# Patient Record
Sex: MALE | Race: WHITE | Employment: OTHER | URBAN - METROPOLITAN AREA
[De-identification: names, ages, dates, MRNs, and addresses within clinical notes are randomized per-mention and may not be internally consistent; named-entity substitution may affect disease eponyms.]

---

## 2018-10-20 ENCOUNTER — APPOINTMENT (OUTPATIENT)
Dept: CT IMAGING | Age: 68
End: 2018-10-20
Payer: MEDICARE

## 2018-10-20 ENCOUNTER — HOSPITAL ENCOUNTER (EMERGENCY)
Age: 68
Discharge: HOME | End: 2018-10-20
Attending: EMERGENCY MEDICINE
Payer: MEDICARE

## 2018-10-20 VITALS
HEIGHT: 66 IN | BODY MASS INDEX: 37.73 KG/M2 | DIASTOLIC BLOOD PRESSURE: 88 MMHG | OXYGEN SATURATION: 94 % | WEIGHT: 234.79 LBS | HEART RATE: 97 BPM | TEMPERATURE: 97.3 F | RESPIRATION RATE: 18 BRPM | SYSTOLIC BLOOD PRESSURE: 148 MMHG

## 2018-10-20 DIAGNOSIS — N23 RENAL COLIC: ICD-10-CM

## 2018-10-20 DIAGNOSIS — N20.1 URETEROLITHIASIS: Primary | ICD-10-CM

## 2018-10-20 LAB
ANION GAP SERPL CALCULATED.3IONS-SCNC: 15 MMOL/L (ref 3–16)
BACTERIA: ABNORMAL /HPF
BASOPHILS ABSOLUTE: 0.1 K/UL (ref 0–0.2)
BASOPHILS RELATIVE PERCENT: 0.5 %
BILIRUBIN URINE: ABNORMAL
BLOOD, URINE: ABNORMAL
BUN BLDV-MCNC: 23 MG/DL (ref 7–20)
CALCIUM SERPL-MCNC: 10.1 MG/DL (ref 8.3–10.6)
CHLORIDE BLD-SCNC: 102 MMOL/L (ref 99–110)
CLARITY: ABNORMAL
CO2: 24 MMOL/L (ref 21–32)
COLOR: ABNORMAL
CREAT SERPL-MCNC: 1.2 MG/DL (ref 0.8–1.3)
CRYSTALS, UA: ABNORMAL
EOSINOPHILS ABSOLUTE: 0.1 K/UL (ref 0–0.6)
EOSINOPHILS RELATIVE PERCENT: 0.8 %
EPITHELIAL CELLS, UA: 1 /HPF (ref 0–5)
GFR AFRICAN AMERICAN: >60
GFR NON-AFRICAN AMERICAN: >60
GLUCOSE BLD-MCNC: 162 MG/DL (ref 70–99)
GLUCOSE URINE: NEGATIVE MG/DL
HCT VFR BLD CALC: 51.4 % (ref 40.5–52.5)
HEMOGLOBIN: 16.9 G/DL (ref 13.5–17.5)
HYALINE CASTS: 10 /LPF (ref 0–8)
KETONES, URINE: ABNORMAL MG/DL
LEUKOCYTE ESTERASE, URINE: NEGATIVE
LYMPHOCYTES ABSOLUTE: 2.5 K/UL (ref 1–5.1)
LYMPHOCYTES RELATIVE PERCENT: 14.9 %
MCH RBC QN AUTO: 30.8 PG (ref 26–34)
MCHC RBC AUTO-ENTMCNC: 32.8 G/DL (ref 31–36)
MCV RBC AUTO: 93.9 FL (ref 80–100)
MICROSCOPIC EXAMINATION: YES
MONOCYTES ABSOLUTE: 0.8 K/UL (ref 0–1.3)
MONOCYTES RELATIVE PERCENT: 4.8 %
MUCUS: ABNORMAL /LPF
NEUTROPHILS ABSOLUTE: 13.1 K/UL (ref 1.7–7.7)
NEUTROPHILS RELATIVE PERCENT: 79 %
NITRITE, URINE: NEGATIVE
PDW BLD-RTO: 13.2 % (ref 12.4–15.4)
PH UA: 5
PLATELET # BLD: 265 K/UL (ref 135–450)
PMV BLD AUTO: 9.1 FL (ref 5–10.5)
POTASSIUM REFLEX MAGNESIUM: 4.5 MMOL/L (ref 3.5–5.1)
PROTEIN UA: 30 MG/DL
RBC # BLD: 5.47 M/UL (ref 4.2–5.9)
RBC UA: ABNORMAL /HPF (ref 0–2)
SODIUM BLD-SCNC: 141 MMOL/L (ref 136–145)
SPECIFIC GRAVITY UA: 1.03
URINE REFLEX TO CULTURE: ABNORMAL
URINE TYPE: ABNORMAL
UROBILINOGEN, URINE: 1 E.U./DL
WBC # BLD: 16.5 K/UL (ref 4–11)
WBC UA: 2 /HPF (ref 0–5)

## 2018-10-20 PROCEDURE — 96374 THER/PROPH/DIAG INJ IV PUSH: CPT

## 2018-10-20 PROCEDURE — 36415 COLL VENOUS BLD VENIPUNCTURE: CPT

## 2018-10-20 PROCEDURE — 96376 TX/PRO/DX INJ SAME DRUG ADON: CPT

## 2018-10-20 PROCEDURE — 96375 TX/PRO/DX INJ NEW DRUG ADDON: CPT

## 2018-10-20 PROCEDURE — 80048 BASIC METABOLIC PNL TOTAL CA: CPT

## 2018-10-20 PROCEDURE — 99284 EMERGENCY DEPT VISIT MOD MDM: CPT

## 2018-10-20 PROCEDURE — 2580000003 HC RX 258: Performed by: EMERGENCY MEDICINE

## 2018-10-20 PROCEDURE — 81001 URINALYSIS AUTO W/SCOPE: CPT

## 2018-10-20 PROCEDURE — 6360000002 HC RX W HCPCS: Performed by: EMERGENCY MEDICINE

## 2018-10-20 PROCEDURE — 85025 COMPLETE CBC W/AUTO DIFF WBC: CPT

## 2018-10-20 PROCEDURE — 96361 HYDRATE IV INFUSION ADD-ON: CPT

## 2018-10-20 PROCEDURE — 74176 CT ABD & PELVIS W/O CONTRAST: CPT

## 2018-10-20 RX ORDER — ONDANSETRON 4 MG/1
4-8 TABLET, ORALLY DISINTEGRATING ORAL EVERY 12 HOURS PRN
Qty: 12 TABLET | Refills: 0 | Status: SHIPPED | OUTPATIENT
Start: 2018-10-20

## 2018-10-20 RX ORDER — OXYCODONE HYDROCHLORIDE AND ACETAMINOPHEN 5; 325 MG/1; MG/1
1 TABLET ORAL EVERY 6 HOURS PRN
Qty: 20 TABLET | Refills: 0 | Status: SHIPPED | OUTPATIENT
Start: 2018-10-20 | End: 2018-10-25

## 2018-10-20 RX ORDER — ONDANSETRON 2 MG/ML
4 INJECTION INTRAMUSCULAR; INTRAVENOUS ONCE
Status: COMPLETED | OUTPATIENT
Start: 2018-10-20 | End: 2018-10-20

## 2018-10-20 RX ORDER — 0.9 % SODIUM CHLORIDE 0.9 %
1000 INTRAVENOUS SOLUTION INTRAVENOUS ONCE
Status: COMPLETED | OUTPATIENT
Start: 2018-10-20 | End: 2018-10-20

## 2018-10-20 RX ADMIN — HYDROMORPHONE HYDROCHLORIDE 1 MG: 1 INJECTION, SOLUTION INTRAMUSCULAR; INTRAVENOUS; SUBCUTANEOUS at 19:34

## 2018-10-20 RX ADMIN — HYDROMORPHONE HYDROCHLORIDE 1 MG: 1 INJECTION, SOLUTION INTRAMUSCULAR; INTRAVENOUS; SUBCUTANEOUS at 17:58

## 2018-10-20 RX ADMIN — SODIUM CHLORIDE 1000 ML: 9 INJECTION, SOLUTION INTRAVENOUS at 17:57

## 2018-10-20 RX ADMIN — ONDANSETRON 4 MG: 2 INJECTION INTRAMUSCULAR; INTRAVENOUS at 17:57

## 2018-10-20 RX ADMIN — ONDANSETRON 4 MG: 2 INJECTION INTRAMUSCULAR; INTRAVENOUS at 19:15

## 2018-10-20 ASSESSMENT — PAIN DESCRIPTION - ORIENTATION: ORIENTATION: LEFT

## 2018-10-20 ASSESSMENT — PAIN SCALES - GENERAL
PAINLEVEL_OUTOF10: 10
PAINLEVEL_OUTOF10: 8

## 2018-10-20 ASSESSMENT — PAIN DESCRIPTION - FREQUENCY: FREQUENCY: CONTINUOUS

## 2018-10-20 ASSESSMENT — PAIN DESCRIPTION - PAIN TYPE: TYPE: ACUTE PAIN

## 2018-10-20 ASSESSMENT — PAIN DESCRIPTION - LOCATION: LOCATION: FLANK

## 2018-10-20 NOTE — ED NOTES
[facility]  eMERGENCY dEPARTMENT eNCOUnter        Pt Name: Armida Walton  MRN: [id number]  [place] 1950  Date of evaluation: 10/20/2018  Provider: Tim Pickens MD  PCP: No primary care provider on file. CHIEF COMPLAINT       Chief Complaint   Patient presents with    Flank Pain     left flank pain that began approx 2 hours ago - hx of kidney stones. HISTORY OFPRESENT ILLNESS   (Location/Symptom, Timing/Onset, Context/Setting, Quality, Duration, Modifying Factors,Severity)  Note limiting factors. Armida Walton is a 76 y.o. male  presents to the emergency department with left flank pain which radiates to the left groin. Patient has history of kidney stones in the past before. Patient states he was [facility] [employer] when he felt very nauseated and acute onset of this flank pain. Patient states it feels like the kidney stone he had and they had to put a stent in him last time. It has been multiple years since he passed a stone. Patient has nausea and severe 10 out of 10 left flank pain. Patient vomited once in the department. Some spasms. Patient denies any hematuria or dysuria. There has been no fever. Nursing Notes were all reviewed and agreed with or any disagreements were addressed  in the HPI. REVIEW OF SYSTEMS    (2-9 systems for level 4, 10 or more for level 5)     Review of Systems    Positives and Pertinent negatives as per HPI. Except as noted above in the ROS, all other systems were reviewed andnegative. PASTMEDICAL HISTORY     Past Medical History:   Diagnosis Date    Hypertension     Kidney stone          SURGICAL HISTORY     History reviewed. No pertinent surgical history. CURRENT MEDICATIONS       Previous Medications    No medications on file       ALLERGIES     Patient has no known allergies. FAMILY HISTORY     History reviewed. No pertinent family history.        SOCIAL HISTORY       Social History     Social

## 2023-06-01 PROBLEM — R07.9 CHEST PAIN: Status: ACTIVE | Noted: 2023-06-01

## 2023-06-01 PROBLEM — I16.0 HYPERTENSIVE URGENCY: Status: ACTIVE | Noted: 2023-06-01

## 2023-06-01 PROBLEM — M79.89 LEG SWELLING: Status: ACTIVE | Noted: 2023-06-01

## 2023-06-02 PROBLEM — R09.89 SUSPECTED CHF (CONGESTIVE HEART FAILURE): Status: ACTIVE | Noted: 2023-06-02

## 2023-06-05 PROBLEM — R60.0 LOCALIZED EDEMA: Status: ACTIVE | Noted: 2023-06-05

## 2023-06-05 PROBLEM — N17.9 AKI (ACUTE KIDNEY INJURY) (HCC): Status: ACTIVE | Noted: 2023-06-05

## 2023-06-26 ENCOUNTER — OFFICE VISIT (OUTPATIENT)
Dept: CARDIOLOGY CLINIC | Age: 73
End: 2023-06-26
Payer: MEDICARE

## 2023-06-26 VITALS
OXYGEN SATURATION: 93 % | WEIGHT: 229 LBS | SYSTOLIC BLOOD PRESSURE: 130 MMHG | DIASTOLIC BLOOD PRESSURE: 80 MMHG | BODY MASS INDEX: 38.15 KG/M2 | HEIGHT: 65 IN | HEART RATE: 97 BPM

## 2023-06-26 DIAGNOSIS — I10 PRIMARY HYPERTENSION: ICD-10-CM

## 2023-06-26 DIAGNOSIS — N17.9 AKI (ACUTE KIDNEY INJURY) (HCC): ICD-10-CM

## 2023-06-26 DIAGNOSIS — R60.0 LOCALIZED EDEMA: ICD-10-CM

## 2023-06-26 DIAGNOSIS — R07.9 CHEST PAIN, UNSPECIFIED TYPE: ICD-10-CM

## 2023-06-26 DIAGNOSIS — R00.0 TACHYCARDIA: Primary | ICD-10-CM

## 2023-06-26 LAB
ALBUMIN SERPL-MCNC: 4.4 G/DL (ref 3.4–5)
ANION GAP SERPL CALCULATED.3IONS-SCNC: 14 MMOL/L (ref 3–16)
BUN SERPL-MCNC: 20 MG/DL (ref 7–20)
CALCIUM SERPL-MCNC: 9.4 MG/DL (ref 8.3–10.6)
CHLORIDE SERPL-SCNC: 101 MMOL/L (ref 99–110)
CO2 SERPL-SCNC: 26 MMOL/L (ref 21–32)
CREAT SERPL-MCNC: 1.1 MG/DL (ref 0.8–1.3)
GFR SERPLBLD CREATININE-BSD FMLA CKD-EPI: >60 ML/MIN/{1.73_M2}
GLUCOSE SERPL-MCNC: 85 MG/DL (ref 70–99)
PHOSPHATE SERPL-MCNC: 2.7 MG/DL (ref 2.5–4.9)
POTASSIUM SERPL-SCNC: 4.9 MMOL/L (ref 3.5–5.1)
SODIUM SERPL-SCNC: 141 MMOL/L (ref 136–145)

## 2023-06-26 PROCEDURE — 99214 OFFICE O/P EST MOD 30 MIN: CPT | Performed by: NURSE PRACTITIONER

## 2023-06-26 PROCEDURE — 3079F DIAST BP 80-89 MM HG: CPT | Performed by: NURSE PRACTITIONER

## 2023-06-26 PROCEDURE — 3075F SYST BP GE 130 - 139MM HG: CPT | Performed by: NURSE PRACTITIONER

## 2023-06-26 PROCEDURE — 93000 ELECTROCARDIOGRAM COMPLETE: CPT | Performed by: NURSE PRACTITIONER

## 2023-06-26 PROCEDURE — 1123F ACP DISCUSS/DSCN MKR DOCD: CPT | Performed by: NURSE PRACTITIONER

## 2023-06-26 RX ORDER — TORSEMIDE 20 MG/1
20 TABLET ORAL DAILY
Qty: 30 TABLET | Refills: 3 | Status: SHIPPED | OUTPATIENT
Start: 2023-06-26

## 2023-07-13 ENCOUNTER — OFFICE VISIT (OUTPATIENT)
Dept: CARDIOLOGY CLINIC | Age: 73
End: 2023-07-13
Payer: MEDICARE

## 2023-07-13 VITALS
SYSTOLIC BLOOD PRESSURE: 130 MMHG | OXYGEN SATURATION: 94 % | HEART RATE: 83 BPM | DIASTOLIC BLOOD PRESSURE: 80 MMHG | HEIGHT: 65 IN | BODY MASS INDEX: 39.99 KG/M2 | WEIGHT: 240 LBS

## 2023-07-13 DIAGNOSIS — R60.0 LOCALIZED EDEMA: Primary | ICD-10-CM

## 2023-07-13 DIAGNOSIS — R07.9 CHEST PAIN, UNSPECIFIED TYPE: ICD-10-CM

## 2023-07-13 DIAGNOSIS — N17.9 AKI (ACUTE KIDNEY INJURY) (HCC): ICD-10-CM

## 2023-07-13 DIAGNOSIS — I10 PRIMARY HYPERTENSION: ICD-10-CM

## 2023-07-13 PROCEDURE — 3079F DIAST BP 80-89 MM HG: CPT | Performed by: NURSE PRACTITIONER

## 2023-07-13 PROCEDURE — 1123F ACP DISCUSS/DSCN MKR DOCD: CPT | Performed by: NURSE PRACTITIONER

## 2023-07-13 PROCEDURE — 99214 OFFICE O/P EST MOD 30 MIN: CPT | Performed by: NURSE PRACTITIONER

## 2023-07-13 PROCEDURE — 3075F SYST BP GE 130 - 139MM HG: CPT | Performed by: NURSE PRACTITIONER

## 2023-07-13 RX ORDER — ISOSORBIDE MONONITRATE 30 MG/1
30 TABLET, EXTENDED RELEASE ORAL DAILY
Qty: 30 TABLET | Refills: 3 | Status: SHIPPED | OUTPATIENT
Start: 2023-07-13

## 2023-07-13 RX ORDER — TORSEMIDE 20 MG/1
40 TABLET ORAL DAILY
Qty: 60 TABLET | Refills: 3 | Status: SHIPPED | OUTPATIENT
Start: 2023-07-13

## 2023-07-13 NOTE — PROGRESS NOTES
[facility]   Cardiology Office Visit      Date: 7/13/2023  CC:    Chief Complaint   Patient presents with    Follow-up       I had the privilege of visiting Cody Glasgow in the office. Presents today for follow up for two week follow up.     68 y.o. male with a past medical history significant for hypertension, hyperlipidemia. Patient was hospitalized due to markedly elevated blood pressure and  significant bilateral lower extremity edema and shortness of breath. Since last OV he is walking on his own and reports his legs feel better and he is able to put shoes on however his wt is up to 240 lb despite starting diuretic therapy at last OV. He denies orthopnea or chest pain. He reports he is compliant with fluid and Na restrictions however he is not able to recall what he normally consumes. HPI: Cody Glasgwo is a 68 y.o. Past Medical History:   Diagnosis Date    Hypertension     Kidney stone         History reviewed. No pertinent surgical history. Allergies:  No Known Allergies    Medication:  Current Outpatient Medications   Medication Sig Dispense Refill    isosorbide mononitrate (IMDUR) 30 MG extended release tablet Take 1 tablet by mouth daily 30 tablet 3    torsemide (DEMADEX) 20 MG tablet Take 2 tablets by mouth daily 60 tablet 3    rosuvastatin (CRESTOR) 20 MG tablet Take 1 tablet by mouth daily      potassium chloride (KLOR-CON) 10 MEQ extended release tablet Take 1 tablet by mouth daily      hydrALAZINE (APRESOLINE) 50 MG tablet Take 1 tablet by mouth 2 times daily       No current facility-administered medications for this visit. Social History:  Reviewed. reports that he has never smoked. He has never used smokeless tobacco. He reports that he does not drink alcohol and does not use drugs. Family History:  Reviewed. family history is not on file.        Review of System:     General ROS: negative for chills, fever, change in weight   Psychological

## 2023-07-14 ENCOUNTER — TELEPHONE (OUTPATIENT)
Dept: CARDIOLOGY CLINIC | Age: 73
End: 2023-07-14

## 2023-07-14 NOTE — TELEPHONE ENCOUNTER
Please call and instruct to take Tylenol/acetaminophen per packing instructions 1 hour prior to taking Imdur/isosobide therapy. Keep us posted. Thanks.

## 2023-07-14 NOTE — TELEPHONE ENCOUNTER
Irvin's sister Maddy Canon called in this morning, she brought him in for an appointment yesterday she states he was started on a new medication Isosorbide Mononitrate she states he is having headaches and would like to know if he can take anything for the headaches. She can be reached at [phone].

## 2023-07-24 ENCOUNTER — TELEPHONE (OUTPATIENT)
Dept: CARDIOLOGY CLINIC | Age: 73
End: 2023-07-24

## 2023-07-24 NOTE — TELEPHONE ENCOUNTER
Debara Rinne, NP referred patient to Dr. Sonia Joseph during recent [address]. She instructed him to see him the following month, in August. Irvin's sister, Cristal Diaz called the office stating he is scheduled for September and wanted to know if this appointment is okay or if he needs to be scheduled within the timeframe set by Debara Rinne, NP?      Linda's callback: [phone]

## 2023-07-24 NOTE — TELEPHONE ENCOUNTER
Alyssa Palafox, is it okay if patient is seen on 9/18/23 with Dr. Adri Kern or do you prefer a sooner appointment? Thanks.

## 2023-07-24 NOTE — TELEPHONE ENCOUNTER
Please notify patient appointment is fine, but she needs to call with worsening edema and we will schedule a sooner appointment with Azalea Stephens NP. Thanks.

## 2023-07-24 NOTE — TELEPHONE ENCOUNTER
The timing of the f/u is fine however if pt continues to have edema may need to see me in August before appt with Dr Sukh Reeves

## 2023-09-07 NOTE — PROGRESS NOTES
Normal gross motor and sensory exam.       Labs     CBC:   Lab Results   Component Value Date/Time    WBC 10.7 06/12/2023 05:04 AM    RBC 5.47 06/12/2023 05:04 AM    HGB 17.0 06/12/2023 05:04 AM    HCT 49.5 06/12/2023 05:04 AM    MCV 90.4 06/12/2023 05:04 AM    RDW 13.6 06/12/2023 05:04 AM     06/12/2023 05:04 AM     CMP:  Lab Results   Component Value Date/Time     06/26/2023 10:54 AM    K 4.9 06/26/2023 10:54 AM    K 4.5 10/20/2018 05:52 PM     06/26/2023 10:54 AM    CO2 26 06/26/2023 10:54 AM    BUN 20 06/26/2023 10:54 AM    CREATININE 1.1 06/26/2023 10:54 AM    GFRAA >60 10/20/2018 05:52 PM    AGRATIO 1.5 06/01/2023 03:43 PM    LABGLOM >60 06/26/2023 10:54 AM    GLUCOSE 85 06/26/2023 10:54 AM    PROT 6.8 06/01/2023 03:43 PM    CALCIUM 9.4 06/26/2023 10:54 AM    BILITOT 0.6 06/01/2023 03:43 PM    ALKPHOS 79 06/01/2023 03:43 PM    AST 18 06/01/2023 03:43 PM    ALT 24 06/01/2023 03:43 PM     PT/INR:  No results found for: PTINR  HgBA1c:No results found for: LABA1C  No results found for: CKTOTAL, CKMB, CKMBINDEX, TROPONINI    CURRENT Medications:  Current Outpatient Medications on File Prior to Visit   Medication Sig Dispense Refill    isosorbide mononitrate (IMDUR) 30 MG extended release tablet Take 1 tablet by mouth daily 30 tablet 3    torsemide (DEMADEX) 20 MG tablet Take 2 tablets by mouth daily 60 tablet 3    rosuvastatin (CRESTOR) 20 MG tablet Take 1 tablet by mouth daily      potassium chloride (KLOR-CON) 10 MEQ extended release tablet Take 1 tablet by mouth daily      hydrALAZINE (APRESOLINE) 50 MG tablet Take 1 tablet by mouth 2 times daily       No current facility-administered medications on file prior to visit.        Patient Active Problem List   Diagnosis    Chest pain    Leg swelling    Asymptomatic hypertensive urgency    Suspected CHF (congestive heart failure)    Localized edema    JELENA (acute kidney injury) ([address])    Tachycardia    Primary hypertension         Prior cardiac

## 2023-09-18 ENCOUNTER — OFFICE VISIT (OUTPATIENT)
Dept: CARDIOLOGY CLINIC | Age: 73
End: 2023-09-18

## 2023-09-18 VITALS
HEIGHT: 65 IN | SYSTOLIC BLOOD PRESSURE: 134 MMHG | HEART RATE: 78 BPM | WEIGHT: 227 LBS | DIASTOLIC BLOOD PRESSURE: 84 MMHG | OXYGEN SATURATION: 95 % | BODY MASS INDEX: 37.82 KG/M2

## 2023-09-18 DIAGNOSIS — M79.89 LEG SWELLING: Primary | ICD-10-CM

## 2023-10-17 RX ORDER — ISOSORBIDE MONONITRATE 30 MG/1
30 TABLET, EXTENDED RELEASE ORAL DAILY
Qty: 30 TABLET | Refills: 3 | Status: SHIPPED | OUTPATIENT
Start: 2023-10-17

## 2023-10-17 NOTE — TELEPHONE ENCOUNTER
Requested Prescriptions     Pending Prescriptions Disp Refills    isosorbide mononitrate (IMDUR) 30 MG extended release tablet [Pharmacy Med Name: [facility] 30 30 Tablet] 28 tablet 3     Sig: TAKE 1 TABLET BY MOUTH DAILY          Number: 30    Refills: 3    Last Office Visit: 9/18/2023     Next Office Visit: None scheduled     Last Refill: 07/13/2023    Last Labs: 06/12/2023

## 2025-02-05 ENCOUNTER — APPOINTMENT (OUTPATIENT)
Dept: CT IMAGING | Age: 75
DRG: 065 | End: 2025-02-05
Payer: MEDICARE

## 2025-02-05 ENCOUNTER — HOSPITAL ENCOUNTER (INPATIENT)
Age: 75
LOS: 6 days | Discharge: HOME | DRG: 065 | End: 2025-02-14
Attending: EMERGENCY MEDICINE | Admitting: STUDENT IN AN ORGANIZED HEALTH CARE EDUCATION/TRAINING PROGRAM
Payer: MEDICARE

## 2025-02-05 ENCOUNTER — APPOINTMENT (OUTPATIENT)
Dept: GENERAL RADIOLOGY | Age: 75
DRG: 065 | End: 2025-02-05
Payer: MEDICARE

## 2025-02-05 DIAGNOSIS — R53.1 GENERAL WEAKNESS: Primary | ICD-10-CM

## 2025-02-05 DIAGNOSIS — R65.10 SIRS (SYSTEMIC INFLAMMATORY RESPONSE SYNDROME) (HCC): ICD-10-CM

## 2025-02-05 DIAGNOSIS — I63.9 CEREBROVASCULAR ACCIDENT (CVA), UNSPECIFIED MECHANISM (HCC): ICD-10-CM

## 2025-02-05 DIAGNOSIS — R29.6 FALLS: ICD-10-CM

## 2025-02-05 LAB
ALBUMIN SERPL-MCNC: 4.3 G/DL (ref 3.4–5)
ALBUMIN/GLOB SERPL: 1.9 {RATIO} (ref 1.1–2.2)
ALP SERPL-CCNC: 87 U/L (ref 40–129)
ALT SERPL-CCNC: 12 U/L (ref 10–40)
ANION GAP SERPL CALCULATED.3IONS-SCNC: 13 MMOL/L (ref 3–16)
AST SERPL-CCNC: 16 U/L (ref 15–37)
BASOPHILS # BLD: 0 K/UL (ref 0–0.2)
BASOPHILS NFR BLD: 0.2 %
BILIRUB SERPL-MCNC: 0.6 MG/DL (ref 0–1)
BUN SERPL-MCNC: 16 MG/DL (ref 7–20)
CALCIUM SERPL-MCNC: 9.1 MG/DL (ref 8.3–10.6)
CHLORIDE SERPL-SCNC: 103 MMOL/L (ref 99–110)
CK SERPL-CCNC: 92 U/L (ref 39–308)
CO2 SERPL-SCNC: 23 MMOL/L (ref 21–32)
CREAT SERPL-MCNC: 1.1 MG/DL (ref 0.8–1.3)
DEPRECATED RDW RBC AUTO: 13.3 % (ref 12.4–15.4)
EOSINOPHIL # BLD: 0 K/UL (ref 0–0.6)
EOSINOPHIL NFR BLD: 0.1 %
FLUAV RNA RESP QL NAA+PROBE: NOT DETECTED
FLUBV RNA RESP QL NAA+PROBE: NOT DETECTED
GFR SERPLBLD CREATININE-BSD FMLA CKD-EPI: 70 ML/MIN/{1.73_M2}
GLUCOSE SERPL-MCNC: 131 MG/DL (ref 70–99)
HCT VFR BLD AUTO: 43.4 % (ref 40.5–52.5)
HGB BLD-MCNC: 14.7 G/DL (ref 13.5–17.5)
LACTATE BLDV-SCNC: 2.6 MMOL/L (ref 0.4–1.9)
LYMPHOCYTES # BLD: 0.8 K/UL (ref 1–5.1)
LYMPHOCYTES NFR BLD: 6.4 %
MCH RBC QN AUTO: 31.1 PG (ref 26–34)
MCHC RBC AUTO-ENTMCNC: 33.8 G/DL (ref 31–36)
MCV RBC AUTO: 91.9 FL (ref 80–100)
MONOCYTES # BLD: 1 K/UL (ref 0–1.3)
MONOCYTES NFR BLD: 7.2 %
NEUTROPHILS # BLD: 11.4 K/UL (ref 1.7–7.7)
NEUTROPHILS NFR BLD: 86.1 %
NT-PROBNP SERPL-MCNC: 67 PG/ML (ref 0–449)
PLATELET # BLD AUTO: 187 K/UL (ref 135–450)
PMV BLD AUTO: 9.5 FL (ref 5–10.5)
POTASSIUM SERPL-SCNC: 4 MMOL/L (ref 3.5–5.1)
PROCALCITONIN SERPL IA-MCNC: 0.12 NG/ML (ref 0–0.15)
PROT SERPL-MCNC: 6.6 G/DL (ref 6.4–8.2)
RBC # BLD AUTO: 4.73 M/UL (ref 4.2–5.9)
SARS-COV-2 RNA RESP QL NAA+PROBE: NOT DETECTED
SODIUM SERPL-SCNC: 139 MMOL/L (ref 136–145)
TROPONIN, HIGH SENSITIVITY: 17 NG/L (ref 0–22)
TROPONIN, HIGH SENSITIVITY: 17 NG/L (ref 0–22)
WBC # BLD AUTO: 13.3 K/UL (ref 4–11)

## 2025-02-05 PROCEDURE — 82550 ASSAY OF CK (CPK): CPT

## 2025-02-05 PROCEDURE — 96376 TX/PRO/DX INJ SAME DRUG ADON: CPT

## 2025-02-05 PROCEDURE — 6360000004 HC RX CONTRAST MEDICATION: Performed by: PHYSICIAN ASSISTANT

## 2025-02-05 PROCEDURE — 74177 CT ABD & PELVIS W/CONTRAST: CPT

## 2025-02-05 PROCEDURE — 96367 TX/PROPH/DG ADDL SEQ IV INF: CPT

## 2025-02-05 PROCEDURE — 96365 THER/PROPH/DIAG IV INF INIT: CPT

## 2025-02-05 PROCEDURE — 6360000002 HC RX W HCPCS: Performed by: EMERGENCY MEDICINE

## 2025-02-05 PROCEDURE — 87040 BLOOD CULTURE FOR BACTERIA: CPT

## 2025-02-05 PROCEDURE — 87636 SARSCOV2 & INF A&B AMP PRB: CPT

## 2025-02-05 PROCEDURE — 93005 ELECTROCARDIOGRAM TRACING: CPT | Performed by: PHYSICIAN ASSISTANT

## 2025-02-05 PROCEDURE — 99285 EMERGENCY DEPT VISIT HI MDM: CPT

## 2025-02-05 PROCEDURE — 70450 CT HEAD/BRAIN W/O DYE: CPT

## 2025-02-05 PROCEDURE — 80053 COMPREHEN METABOLIC PANEL: CPT

## 2025-02-05 PROCEDURE — 96375 TX/PRO/DX INJ NEW DRUG ADDON: CPT

## 2025-02-05 PROCEDURE — 85025 COMPLETE CBC W/AUTO DIFF WBC: CPT

## 2025-02-05 PROCEDURE — 72125 CT NECK SPINE W/O DYE: CPT

## 2025-02-05 PROCEDURE — 96366 THER/PROPH/DIAG IV INF ADDON: CPT

## 2025-02-05 PROCEDURE — 73590 X-RAY EXAM OF LOWER LEG: CPT

## 2025-02-05 PROCEDURE — 71260 CT THORAX DX C+: CPT

## 2025-02-05 PROCEDURE — 2580000003 HC RX 258: Performed by: EMERGENCY MEDICINE

## 2025-02-05 PROCEDURE — 6370000000 HC RX 637 (ALT 250 FOR IP): Performed by: EMERGENCY MEDICINE

## 2025-02-05 PROCEDURE — 94640 AIRWAY INHALATION TREATMENT: CPT

## 2025-02-05 PROCEDURE — 73552 X-RAY EXAM OF FEMUR 2/>: CPT

## 2025-02-05 PROCEDURE — 83605 ASSAY OF LACTIC ACID: CPT

## 2025-02-05 PROCEDURE — 94760 N-INVAS EAR/PLS OXIMETRY 1: CPT

## 2025-02-05 PROCEDURE — 96361 HYDRATE IV INFUSION ADD-ON: CPT

## 2025-02-05 PROCEDURE — 84145 PROCALCITONIN (PCT): CPT

## 2025-02-05 PROCEDURE — 2580000003 HC RX 258: Performed by: PHYSICIAN ASSISTANT

## 2025-02-05 PROCEDURE — 83880 ASSAY OF NATRIURETIC PEPTIDE: CPT

## 2025-02-05 PROCEDURE — 73502 X-RAY EXAM HIP UNI 2-3 VIEWS: CPT

## 2025-02-05 PROCEDURE — 6360000002 HC RX W HCPCS: Performed by: PHYSICIAN ASSISTANT

## 2025-02-05 PROCEDURE — 84484 ASSAY OF TROPONIN QUANT: CPT

## 2025-02-05 RX ORDER — MORPHINE SULFATE 4 MG/ML
4 INJECTION, SOLUTION INTRAMUSCULAR; INTRAVENOUS
Status: COMPLETED | OUTPATIENT
Start: 2025-02-05 | End: 2025-02-05

## 2025-02-05 RX ORDER — IPRATROPIUM BROMIDE AND ALBUTEROL SULFATE 2.5; .5 MG/3ML; MG/3ML
1 SOLUTION RESPIRATORY (INHALATION) ONCE
Status: COMPLETED | OUTPATIENT
Start: 2025-02-05 | End: 2025-02-05

## 2025-02-05 RX ORDER — 0.9 % SODIUM CHLORIDE 0.9 %
30 INTRAVENOUS SOLUTION INTRAVENOUS ONCE
Status: COMPLETED | OUTPATIENT
Start: 2025-02-05 | End: 2025-02-06

## 2025-02-05 RX ADMIN — MORPHINE SULFATE 4 MG: 4 INJECTION, SOLUTION INTRAMUSCULAR; INTRAVENOUS at 23:42

## 2025-02-05 RX ADMIN — CEFEPIME 2000 MG: 2 INJECTION, POWDER, FOR SOLUTION INTRAVENOUS at 22:33

## 2025-02-05 RX ADMIN — VANCOMYCIN HYDROCHLORIDE 2000 MG: 1 INJECTION, POWDER, LYOPHILIZED, FOR SOLUTION INTRAVENOUS at 23:46

## 2025-02-05 RX ADMIN — IOHEXOL 75 ML: 350 INJECTION, SOLUTION INTRAVENOUS at 21:18

## 2025-02-05 RX ADMIN — IPRATROPIUM BROMIDE AND ALBUTEROL SULFATE 1 DOSE: .5; 3 SOLUTION RESPIRATORY (INHALATION) at 23:39

## 2025-02-05 RX ADMIN — MORPHINE SULFATE 4 MG: 4 INJECTION, SOLUTION INTRAMUSCULAR; INTRAVENOUS at 22:31

## 2025-02-05 RX ADMIN — SODIUM CHLORIDE 1914 ML: 9 INJECTION, SOLUTION INTRAVENOUS at 22:56

## 2025-02-05 ASSESSMENT — LIFESTYLE VARIABLES
HOW MANY STANDARD DRINKS CONTAINING ALCOHOL DO YOU HAVE ON A TYPICAL DAY: PATIENT DOES NOT DRINK
HOW OFTEN DO YOU HAVE A DRINK CONTAINING ALCOHOL: NEVER

## 2025-02-05 ASSESSMENT — PAIN - FUNCTIONAL ASSESSMENT: PAIN_FUNCTIONAL_ASSESSMENT: 0-10

## 2025-02-05 ASSESSMENT — PAIN SCALES - GENERAL: PAINLEVEL_OUTOF10: 10

## 2025-02-06 PROBLEM — R29.6 FREQUENT FALLS: Status: ACTIVE | Noted: 2025-02-06

## 2025-02-06 LAB
BILIRUB UR QL STRIP.AUTO: NEGATIVE
CLARITY UR: CLEAR
COLOR UR: YELLOW
GLUCOSE UR STRIP.AUTO-MCNC: NEGATIVE MG/DL
HGB UR QL STRIP.AUTO: NEGATIVE
KETONES UR STRIP.AUTO-MCNC: NEGATIVE MG/DL
LACTATE BLDV-SCNC: 1.3 MMOL/L (ref 0.4–1.9)
LEUKOCYTE ESTERASE UR QL STRIP.AUTO: NEGATIVE
NITRITE UR QL STRIP.AUTO: NEGATIVE
PH UR STRIP.AUTO: 7 [PH] (ref 5–8)
PROT UR STRIP.AUTO-MCNC: NEGATIVE MG/DL
SP GR UR STRIP.AUTO: 1.02 (ref 1–1.03)
UA COMPLETE W REFLEX CULTURE PNL UR: NORMAL
UA DIPSTICK W REFLEX MICRO PNL UR: NORMAL
URN SPEC COLLECT METH UR: NORMAL
UROBILINOGEN UR STRIP-ACNC: 0.2 E.U./DL

## 2025-02-06 PROCEDURE — 2500000003 HC RX 250 WO HCPCS: Performed by: STUDENT IN AN ORGANIZED HEALTH CARE EDUCATION/TRAINING PROGRAM

## 2025-02-06 PROCEDURE — G0378 HOSPITAL OBSERVATION PER HR: HCPCS

## 2025-02-06 PROCEDURE — 81003 URINALYSIS AUTO W/O SCOPE: CPT

## 2025-02-06 PROCEDURE — 6360000002 HC RX W HCPCS: Performed by: NURSE PRACTITIONER

## 2025-02-06 PROCEDURE — 97530 THERAPEUTIC ACTIVITIES: CPT

## 2025-02-06 PROCEDURE — 2500000003 HC RX 250 WO HCPCS: Performed by: INTERNAL MEDICINE

## 2025-02-06 PROCEDURE — 6370000000 HC RX 637 (ALT 250 FOR IP): Performed by: NURSE PRACTITIONER

## 2025-02-06 PROCEDURE — 99284 EMERGENCY DEPT VISIT MOD MDM: CPT | Performed by: NURSE PRACTITIONER

## 2025-02-06 PROCEDURE — 97162 PT EVAL MOD COMPLEX 30 MIN: CPT

## 2025-02-06 PROCEDURE — 6370000000 HC RX 637 (ALT 250 FOR IP): Performed by: STUDENT IN AN ORGANIZED HEALTH CARE EDUCATION/TRAINING PROGRAM

## 2025-02-06 PROCEDURE — 97166 OT EVAL MOD COMPLEX 45 MIN: CPT

## 2025-02-06 PROCEDURE — 96375 TX/PRO/DX INJ NEW DRUG ADDON: CPT

## 2025-02-06 PROCEDURE — 6360000002 HC RX W HCPCS: Performed by: INTERNAL MEDICINE

## 2025-02-06 PROCEDURE — 96372 THER/PROPH/DIAG INJ SC/IM: CPT

## 2025-02-06 RX ORDER — SODIUM CHLORIDE 0.9 % (FLUSH) 0.9 %
5-40 SYRINGE (ML) INJECTION EVERY 12 HOURS SCHEDULED
Status: DISCONTINUED | OUTPATIENT
Start: 2025-02-06 | End: 2025-02-14 | Stop reason: HOSPADM

## 2025-02-06 RX ORDER — POTASSIUM CHLORIDE 7.45 MG/ML
10 INJECTION INTRAVENOUS PRN
Status: DISCONTINUED | OUTPATIENT
Start: 2025-02-06 | End: 2025-02-14 | Stop reason: HOSPADM

## 2025-02-06 RX ORDER — SODIUM CHLORIDE 9 MG/ML
INJECTION, SOLUTION INTRAVENOUS PRN
Status: DISCONTINUED | OUTPATIENT
Start: 2025-02-06 | End: 2025-02-14 | Stop reason: HOSPADM

## 2025-02-06 RX ORDER — MORPHINE SULFATE 4 MG/ML
4 INJECTION, SOLUTION INTRAMUSCULAR; INTRAVENOUS EVERY 4 HOURS PRN
Status: DISCONTINUED | OUTPATIENT
Start: 2025-02-06 | End: 2025-02-14 | Stop reason: HOSPADM

## 2025-02-06 RX ORDER — SODIUM CHLORIDE 0.9 % (FLUSH) 0.9 %
5-40 SYRINGE (ML) INJECTION PRN
Status: DISCONTINUED | OUTPATIENT
Start: 2025-02-06 | End: 2025-02-14 | Stop reason: HOSPADM

## 2025-02-06 RX ORDER — ATORVASTATIN CALCIUM 80 MG/1
80 TABLET, FILM COATED ORAL NIGHTLY
Status: DISCONTINUED | OUTPATIENT
Start: 2025-02-06 | End: 2025-02-14 | Stop reason: HOSPADM

## 2025-02-06 RX ORDER — ONDANSETRON 4 MG/1
4 TABLET, ORALLY DISINTEGRATING ORAL EVERY 8 HOURS PRN
Status: DISCONTINUED | OUTPATIENT
Start: 2025-02-06 | End: 2025-02-06

## 2025-02-06 RX ORDER — TRAMADOL HYDROCHLORIDE 50 MG/1
50 TABLET ORAL EVERY 6 HOURS PRN
Status: DISCONTINUED | OUTPATIENT
Start: 2025-02-06 | End: 2025-02-14 | Stop reason: HOSPADM

## 2025-02-06 RX ORDER — SODIUM CHLORIDE 0.9 % (FLUSH) 0.9 %
5-40 SYRINGE (ML) INJECTION EVERY 12 HOURS SCHEDULED
Status: DISCONTINUED | OUTPATIENT
Start: 2025-02-06 | End: 2025-02-13

## 2025-02-06 RX ORDER — ONDANSETRON 4 MG/1
4 TABLET, ORALLY DISINTEGRATING ORAL EVERY 8 HOURS PRN
Status: DISCONTINUED | OUTPATIENT
Start: 2025-02-06 | End: 2025-02-14 | Stop reason: HOSPADM

## 2025-02-06 RX ORDER — ONDANSETRON 2 MG/ML
4 INJECTION INTRAMUSCULAR; INTRAVENOUS EVERY 6 HOURS PRN
Status: DISCONTINUED | OUTPATIENT
Start: 2025-02-06 | End: 2025-02-14 | Stop reason: HOSPADM

## 2025-02-06 RX ORDER — ACETAMINOPHEN 325 MG/1
650 TABLET ORAL EVERY 6 HOURS PRN
Status: DISCONTINUED | OUTPATIENT
Start: 2025-02-06 | End: 2025-02-14 | Stop reason: HOSPADM

## 2025-02-06 RX ORDER — ONDANSETRON 2 MG/ML
4 INJECTION INTRAMUSCULAR; INTRAVENOUS EVERY 6 HOURS PRN
Status: DISCONTINUED | OUTPATIENT
Start: 2025-02-06 | End: 2025-02-06

## 2025-02-06 RX ORDER — ACETAMINOPHEN 500 MG
1000 TABLET ORAL 3 TIMES DAILY
Status: DISCONTINUED | OUTPATIENT
Start: 2025-02-06 | End: 2025-02-14 | Stop reason: HOSPADM

## 2025-02-06 RX ORDER — POLYETHYLENE GLYCOL 3350 17 G/17G
17 POWDER, FOR SOLUTION ORAL DAILY PRN
Status: DISCONTINUED | OUTPATIENT
Start: 2025-02-06 | End: 2025-02-06

## 2025-02-06 RX ORDER — ACETAMINOPHEN 650 MG/1
650 SUPPOSITORY RECTAL EVERY 6 HOURS PRN
Status: DISCONTINUED | OUTPATIENT
Start: 2025-02-06 | End: 2025-02-14 | Stop reason: HOSPADM

## 2025-02-06 RX ORDER — ENOXAPARIN SODIUM 100 MG/ML
30 INJECTION SUBCUTANEOUS 2 TIMES DAILY
Status: DISCONTINUED | OUTPATIENT
Start: 2025-02-06 | End: 2025-02-14 | Stop reason: HOSPADM

## 2025-02-06 RX ORDER — POTASSIUM CHLORIDE 1500 MG/1
40 TABLET, EXTENDED RELEASE ORAL PRN
Status: DISCONTINUED | OUTPATIENT
Start: 2025-02-06 | End: 2025-02-14 | Stop reason: HOSPADM

## 2025-02-06 RX ORDER — POLYETHYLENE GLYCOL 3350 17 G/17G
17 POWDER, FOR SOLUTION ORAL DAILY PRN
Status: DISCONTINUED | OUTPATIENT
Start: 2025-02-06 | End: 2025-02-08

## 2025-02-06 RX ORDER — IRBESARTAN 150 MG/1
150 TABLET ORAL DAILY
COMMUNITY

## 2025-02-06 RX ORDER — ASPIRIN 81 MG/1
81 TABLET, CHEWABLE ORAL DAILY
Status: DISCONTINUED | OUTPATIENT
Start: 2025-02-06 | End: 2025-02-14 | Stop reason: HOSPADM

## 2025-02-06 RX ORDER — ASPIRIN 300 MG/1
300 SUPPOSITORY RECTAL DAILY
Status: DISCONTINUED | OUTPATIENT
Start: 2025-02-06 | End: 2025-02-14 | Stop reason: HOSPADM

## 2025-02-06 RX ORDER — KETOROLAC TROMETHAMINE 30 MG/ML
30 INJECTION, SOLUTION INTRAMUSCULAR; INTRAVENOUS ONCE
Status: COMPLETED | OUTPATIENT
Start: 2025-02-06 | End: 2025-02-06

## 2025-02-06 RX ORDER — IPRATROPIUM BROMIDE AND ALBUTEROL SULFATE 2.5; .5 MG/3ML; MG/3ML
1 SOLUTION RESPIRATORY (INHALATION) EVERY 4 HOURS PRN
Status: DISCONTINUED | OUTPATIENT
Start: 2025-02-06 | End: 2025-02-14 | Stop reason: HOSPADM

## 2025-02-06 RX ORDER — MAGNESIUM SULFATE IN WATER 40 MG/ML
2000 INJECTION, SOLUTION INTRAVENOUS PRN
Status: DISCONTINUED | OUTPATIENT
Start: 2025-02-06 | End: 2025-02-14 | Stop reason: HOSPADM

## 2025-02-06 RX ORDER — TAMSULOSIN HYDROCHLORIDE 0.4 MG/1
0.4 CAPSULE ORAL DAILY
COMMUNITY

## 2025-02-06 RX ORDER — LIDOCAINE 4 G/G
1 PATCH TOPICAL DAILY
Status: DISCONTINUED | OUTPATIENT
Start: 2025-02-06 | End: 2025-02-14 | Stop reason: HOSPADM

## 2025-02-06 RX ADMIN — Medication 10 ML: at 10:34

## 2025-02-06 RX ADMIN — ENOXAPARIN SODIUM 30 MG: 100 INJECTION SUBCUTANEOUS at 21:32

## 2025-02-06 RX ADMIN — ACETAMINOPHEN 1000 MG: 500 TABLET ORAL at 17:15

## 2025-02-06 RX ADMIN — ACETAMINOPHEN 1000 MG: 500 TABLET ORAL at 12:04

## 2025-02-06 RX ADMIN — ASPIRIN 81 MG: 81 TABLET, CHEWABLE ORAL at 17:15

## 2025-02-06 RX ADMIN — SODIUM CHLORIDE, PRESERVATIVE FREE 10 ML: 5 INJECTION INTRAVENOUS at 21:33

## 2025-02-06 RX ADMIN — ATORVASTATIN CALCIUM 80 MG: 80 TABLET, FILM COATED ORAL at 21:32

## 2025-02-06 RX ADMIN — Medication 10 ML: at 21:33

## 2025-02-06 RX ADMIN — KETOROLAC TROMETHAMINE 30 MG: 30 INJECTION, SOLUTION INTRAMUSCULAR at 12:04

## 2025-02-06 RX ADMIN — ACETAMINOPHEN 1000 MG: 500 TABLET ORAL at 21:32

## 2025-02-06 RX ADMIN — TRAMADOL HYDROCHLORIDE 50 MG: 50 TABLET, COATED ORAL at 12:04

## 2025-02-06 ASSESSMENT — PAIN - FUNCTIONAL ASSESSMENT: PAIN_FUNCTIONAL_ASSESSMENT: PREVENTS OR INTERFERES SOME ACTIVE ACTIVITIES AND ADLS

## 2025-02-06 ASSESSMENT — LIFESTYLE VARIABLES
HOW OFTEN DO YOU HAVE A DRINK CONTAINING ALCOHOL: NEVER
HOW MANY STANDARD DRINKS CONTAINING ALCOHOL DO YOU HAVE ON A TYPICAL DAY: PATIENT DOES NOT DRINK

## 2025-02-06 ASSESSMENT — PAIN DESCRIPTION - LOCATION
LOCATION: LEG

## 2025-02-06 ASSESSMENT — PAIN DESCRIPTION - ORIENTATION
ORIENTATION: LEFT

## 2025-02-06 ASSESSMENT — PAIN SCALES - GENERAL
PAINLEVEL_OUTOF10: 6
PAINLEVEL_OUTOF10: 6

## 2025-02-06 ASSESSMENT — PAIN DESCRIPTION - DESCRIPTORS: DESCRIPTORS: CRAMPING

## 2025-02-06 NOTE — ED PROVIDER NOTES
I personally saw the patient and made/approved the management plan and take responsibility for the patient management.    For further details of Irvin Arreola's emergency department encounter, please see the advanced practice provider's documentation.    I, Milad Gardner MD, am the primary physician provider of record.    CHIEF COMPLAINT  Chief Complaint   Patient presents with    Fall     Pt [facility] in [facility], pt reports 2 falls today, pt denies hitting head, pt reports all over left sided pain. Pt denies blood thinner use.       Briefly, Irvin Arreola is a 74 y.o. male  who presents to the ED complaining of 2 separate falls apparently today, mainly from the chair to the ground.  Unclear how long he was on the ground for the patient is a very poor historian.  He initially tells me he did not actually fall but then later clarifies that he did not trip and fall from standing but rather from a seated position to the ground.  He later apparently says that his legs just gave out when he was using his socks so it is unclear what actually happened to him.  Denies any headache or loss of consciousness but he does have pain to the left side of his neck and left side of his flank and left leg as well diffusely.  Denies anticoagulation.  Denies alcohol use.  Of note he is quite tachycardic and tachypneic on initial assessment and although he is clearly winded in conversation he says he is not short of breath even though he visibly appears so.  He is not frankly disoriented although is slow to respond to some questions and tangential with his thought process.    FOCUSED PHYSICAL EXAMINATION  /72   Pulse (!) 123   Temp 97 °F (36.1 °C)   Resp 25   Ht 1.676 m (5' 6\")   Wt 108.9 kg (240 lb)   SpO2 95%   BMI 38.74 kg/m²    Focused physical examination notable for mild acute distress, well-appearing, well-nourished, normal speech and mentation without obvious facial

## 2025-02-06 NOTE — CONSULTS
[facility] Orthopedic Surgery  Consult Note    Patient: Irvin Arreola  Admit Date: 2/5/2025  Requesting Physician: Smiley Ross MD  Room: ED-0029/29    Chief complaint: Femoral head AVN    HPI: Irvin Arreola is a 74 y.o. male who presented to [facility] ER with complaints of leg pain and multiple falls recently.   Poor historian. He does not appear to be very active.     Describes pain in the left lateral hip of moderate intensity and of aching nature since 2-3 days ago which is relieved by rest. Denies new numbness/tingling.       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      Relevant notes, labs and other tests reviewed.  Medical History:  Past Medical History:   Diagnosis Date    JELENA (acute kidney injury) (HCC)     High cholesterol     Hypertension     Kidney stone     Leg swelling     Suspected CHF (congestive heart failure)      Past Surgical History:   Procedure Laterality Date    APPENDECTOMY      TONSILLECTOMY         Social History:    reports that he has never smoked. He has never used smokeless tobacco.    Family History:        Problem Relation Age of Onset    Hypertension Mother     No Known Problems Father     No Known Problems Sister     No Known Problems Sister     No Known Problems Sister     No Known Problems Sister     No Known Problems Sister     No Known Problems Sister     No Known Problems Brother     No Known Problems Brother     No Known Problems Brother     No Known Problems Brother     No Known Problems Brother     No Known Problems Brother        Medications:  ALL MEDICATIONS HAVE BEEN REVIEWED:  Scheduled:   sodium chloride flush  5-40 mL IntraVENous 2 times per day    enoxaparin  30 mg SubCUTAneous BID     Continuous:   sodium chloride       PRN:sodium chloride flush, sodium chloride,  potassium chloride **OR** potassium alternative oral replacement **OR** potassium chloride, magnesium sulfate, ondansetron **OR** ondansetron, polyethylene glycol, acetaminophen **OR** acetaminophen, morphine, ipratropium 0.5 mg-albuterol 2.5 mg    Allergies: No Known Allergies    Review of Systems:  Constitutional: Negative for fever, chills, fatigue.   Skin:  Negative for pruritis, rash  Eyes: Negative for photophobia and visual disturbance.   ENT:  Negative for rhinorrhea, epistaxis, sore throat  Respiratory:  Negative for cough and shortness of breath.   Cardiovascular: Negative for chest pain.   Gastrointestinal: Negative for nausea, vomiting, diarrhea.  Genitourinary: Negative for dysuria and difficulty urinating.   Neurological: Negative for confusion, dysarthria, tremors, seizures.   Psychiatric:  Negative for depression or anxiety  Musculoskeletal:  Positive for left lateral hip pain    Objective:  Vitals:    02/06/25 0045   BP: 139/72   Pulse: (!) 123   Resp: 25   Temp:    SpO2: 95%      Physical Examination:  GENERAL: No apparent distress, well-nourished  SKIN:  Warm and dry  EYES: Nonicteric.   ENT: Mucous membranes moist  HEAD: Normocephalic, atraumatic  RESPIRATORY: Resp easy and unlabored  CARDIOVASCULAR: Regular rate and rhythm  GI: Abdomen soft, nontender  NEURO: Awake and alert.  No speech defect  PSYCHIATRIC: Appropriate affect; not agitated  MUSCULOSKELETAL:  Bilateral hips  Inspection: On exam there are no ulcerations, rashes or lesions about the bilateral legs.   There is pain to palpation of the left greater trochanter/bursa.  He has no TTP elsewhere about either legs.  He has no pain with the passive ROM of either hip.  He does have some lateral pain with active engagement of the IT band on the left.   Motor: Intact DF/PF.  There is no pain  Sensation: Grossly intact to light touch throughout the bilateral lower extremities in all nerve distributions.  Vascular:  1+ bilateral DP

## 2025-02-06 NOTE — PROGRESS NOTES
[facility] - Inpatient Rehabilitation Department   Phone: [phone]    Physical Therapy    [x] Initial Evaluation            [] Daily Treatment Note         [] Discharge Summary      Patient: Irvin Arreola   : 1950   MRN: [id number]   Date of Service:  2025  Admitting Diagnosis: Frequent falls  Current Admission Summary: Irvin Arreola is a 74 y.o. male who presented to [facility] ER with complaints of leg pain and multiple falls recently. Poor historian. He does not appear to be very active. Describes pain in the left lateral hip of moderate intensity and of aching nature since 2-3 days ago which is relieved by rest. Denies new numbness/tingling. 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 Patient lives in AL facility and uses no assistive devices by his report to ambulate.    Past Medical History:  has a past medical history of JELENA (acute kidney injury) (HCC), Frequent falls, High cholesterol, Hypertension, Kidney stone, Leg swelling, and Suspected CHF (congestive heart failure).  Past Surgical History:  has a past surgical history that includes Appendectomy and Tonsillectomy.  Discharge Recommendations: Irvin Arreola scored a 9/24 on the AM-PAC short mobility form. Current research shows that an AM-PAC score of 17 or less is typically not associated with a discharge to the patient's home setting. Based on the patient's AM-PAC score and their current functional mobility deficits, it is recommended that the patient have 5-7 sessions per week of Physical Therapy at d/c to increase the patient's independence.  At this time, this patient demonstrates complex nursing, medical, and rehabilitative needs, and would benefit from intensive rehabilitation services upon discharge from the Inpatient setting.  This patient demonstrates the ability to participate in and benefit from an

## 2025-02-06 NOTE — ED NOTES
Patient Name: Irvin Arreola  : 1950 74 y.o.  MRN: [id number]  ED Room #: ED-0029/29     Chief complaint:   Chief Complaint   Patient presents with    Fall     Pt came in from [facility], pt reports 2 falls today, pt denies hitting head, pt reports all over left sided pain. Pt denies blood thinner use.     Hospital Problem/Diagnosis:   Hospital Problems             Last Modified POA    * (Principal) Frequent falls 2025 Yes    Leg swelling 2025 Yes    Tachycardia 2025 Yes    Primary hypertension 2025 Yes         O2 Flow Rate:O2 Device: None (Room air)   (if applicable)  Cardiac Rhythm:   (if applicable)  Active LDA's:   Peripheral IV 25 Right Antecubital (Active)      External Urinary Catheter (Active)   Site Assessment Clean,dry & intact 25 0830   Placement Replaced 25 0830   Perineal Care Yes 25 0830   Suction 40 mmgHg continuous 25 0830   Urine Color Yellow 25 0830   Urine Appearance Clear 25 0830          How does patient ambulate? Unknown, did not assess in the Emergency Department    2. How does patient take pills? Whole with Water    3. Is patient alert? Alert    4. Is patient oriented? To Person    5.   Patient arrived from:  nursing home  Facility Name: [facility]. Pt has an independent care provider.      6. If patient is disoriented or from a Skill Nursing Facility has family been notified of admission? Yes- sister Linda .        If there are any additional questions please reach out to the Emergency Department.

## 2025-02-06 NOTE — ED NOTES
Patient Name: Irvin Arreola  : 1950 74 y.o.  MRN: [id number]  ED Room #: ED-0021/21     Chief complaint:   Chief Complaint   Patient presents with    Fall     Pt came in from [facility] via [facility], pt reports 2 falls today, pt denies hitting head, pt reports all over left sided pain. Pt denies blood thinner use.     Hospital Problem/Diagnosis:   Hospital Problems             Last Modified POA    * (Principal) Frequent falls 2025 Yes         O2 Flow Rate:O2 Device: None (Room air)   (if applicable)  Cardiac Rhythm:   (if applicable)  Active LDA's:   Peripheral IV 25 Right Antecubital (Active)            How does patient ambulate? Unknown, did not assess in the Emergency Department    2. How does patient take pills? Unknown, no oral medications were given in the Emergency Department    3. Is patient alert? Alert    4. Is patient oriented? To Person, To Place, and To Situation    5.   Patient arrived from:      Facility Name: [facility]    6. If patient is disoriented or from a Skill Nursing Facility has family been notified of admission? No    7. Patient belongings?     Disposition of belongings?      8. Any specific patient or family belongings/needs/dynamics?   a. N/a    9. Miscellaneous comments/pending orders?  a. N/a      If there are any additional questions please reach out to the Emergency Department.

## 2025-02-06 NOTE — ED NOTES
Patent resting in bed. Patient alert and oriented.  GCS 15/15.  Skin appropriate for ethnicity, dry and intact.  No signs of acute distress noted at this time. Regular respiratory pattern, normal respiratory depth, unlabored respirations.   denies pain.    Cardiac Rhythm ST.   Patient is continuous cardiac monitoring. Telemetry Alarms audible and alarms set.  Fall risk precautions in place, call light in reach, bed in lowest position, bed side table within reach,  2/2 bedrails up, bed alarm on.   Denies any needs at this time.  Plan of care ongoing.  Call light in reach.

## 2025-02-06 NOTE — PROGRESS NOTES
Patient seen in ED, room 29.  Admission completed.  RN will need to complete the other required items in addition to the 4 Eyes Assessment, Immunizations, Vaccines, Rights and Responsibilities, orientation to room, Plan of Care, Education/Learning Assessment, Education Plan, white board, height and weight, pain assessment and head to toe assessment.  Patient is alert and oriented X 3 (unable to state year).  Patient is from [facility] and is being admitted for Frequent falls.   Home Medication Reconciliation has been/will be completed by the Pharmacy Staff.       No paperwork is with patient at this time: this RN will call nursing facility to see if patient information can be faxed over.      Patient states he was ambulating independently and was able to complete ADL's on his own prior to his falls.

## 2025-02-06 NOTE — PROGRESS NOTES
#Fall  # Acute left-sided weakness  Patient had a fall /unable to get up from the floor after he slid off the chair, which is unusual for him.  Reports left leg weakness but on exam left arm appears to be weaker compared to right and left leg similar strength compared to right.  No obvious facial droop.  Patient also has some dementia and sister at bedside who is also his POA, he may also be doing this likely/unlikely as she does not like the current place he lives in  Last normal time more than 18 hours or at least  Will obtain MRI brain and MRA head and neck  Start aspirin, statin, stroke protocol  PT/OT

## 2025-02-06 NOTE — PROGRESS NOTES
Patient resides at a facility staffed by independent care givers: was able to speak with caregiver who was in a meeting. Stated she would call back with details on patient after her meeting.

## 2025-02-06 NOTE — ED PROVIDER NOTES
[facility] EMERGENCY DEPARTMENT  EMERGENCY DEPARTMENT ENCOUNTER        Pt Name: Irvin Arreola  MRN: [id number]  Birthdate 1950  Date of evaluation: 2/5/2025  Provider: NIRALI Irvin  PCP: No primary care provider on file.  Note Started: 12:18 AM EST 2/6/25       I have seen and evaluated this patient with my supervising physician Gardner      CHIEF COMPLAINT       Chief Complaint   Patient presents with    Fall     Pt [facility], pt reports 2 falls today, pt denies hitting head, pt reports all over left sided pain. Pt denies blood thinner use.       HISTORY OF PRESENT ILLNESS: 1 or more Elements     History From: Patient  Limitations to history : None    Irvin Arreola is a 74 y.o. male with past medical history of CHF and hypertension who presents ED with complaint of a fall.  Patient arrives from assisted living facility for a fall.  Apparently had 2 falls today.  He reports pain to his left side.  No blood thinning medication usage.  He is unsure why he fell.  Denies lightheadedness, dizziness, syncope or near syncope.  Denies headache, visual changes or speech disturbances.  Denies numbness or tingling.  No chest pain or shortness of breath.  Denies abdominal pain, nausea/vomiting, urinary symptoms or changes in bowel movements.    Nursing Notes were all reviewed and agreed with or any disagreements were addressed in the HPI.    REVIEW OF SYSTEMS :      Review of Systems   Constitutional:  Positive for activity change. Negative for appetite change, chills, diaphoresis, fatigue and fever.   Eyes:  Negative for photophobia and visual disturbance.   Respiratory: Negative.  Negative for cough, chest tightness and shortness of breath.    Cardiovascular: Negative.  Negative for chest pain, palpitations and leg swelling.   Gastrointestinal:  Negative for abdominal distention, abdominal pain, constipation, diarrhea, nausea and vomiting.  02/05/25 2315 02/05/25 2330 02/05/25 2351   BP: 130/69 (!) 148/71 (!) 141/63    Pulse: (!) 121 (!) 123 (!) 122    Resp:    28   Temp:       SpO2: 93% 95% 96%    Weight:       Height:           Is this patient to be included in the SEP-1 Core Measure due to severe sepsis or septic shock?   No   Exclusion criteria - the patient is NOT to be included for SEP-1 Core Measure due to:  May have criteria for sepsis, but does not meet criteria for severe sepsis or septic shock    Patient was given the following medications:  Medications   vancomycin (VANCOCIN) 2,000 mg in sodium chloride 0.9 % 500 mL IVPB (2,000 mg IntraVENous New Bag 2/5/25 2346)   iohexol (OMNIPAQUE 350) solution 75 mL (75 mLs IntraVENous Given 2/5/25 2118)   ceFEPIme (MAXIPIME) 2,000 mg in sodium chloride 0.9 % 100 mL IVPB (mini-bag) (0 mg IntraVENous Stopped 2/5/25 2303)   ipratropium 0.5 mg-albuterol 2.5 mg (DUONEB) nebulizer solution 1 Dose (1 Dose Inhalation Given 2/5/25 2339)   morphine injection 4 mg (4 mg IntraVENous Given 2/5/25 2342)   0.9 % sodium chloride IV bolus 1,914 mL (1,914 mLs IntraVENous New Bag 2/5/25 2256)             Chronic Conditions affecting care:   has a past medical history of Hypertension and Kidney stone.    CONSULTS: (Who and What was discussed)  None    Social Determinants Significantly Affecting Health : None    Records Reviewed (External and Source) None    CC/HPI Summary, DDx, ED Course, and Reassessment: 74-year-old male who presents ED with complaint of a fall.  Unclear etiology behind the fall but patient is tachycardic and tachypneic here in the emergency department.  IV established and blood work obtained. White count was 13.3.  Lactic 2.6.  Given tachycardia, tachypnea, lactic acidosis and leukocytosis does meet SIRS criteria was ordered broad-spectrum labs with vancomycin and cefepime.  Held off initially on fluids given history of CHF and pending workup.  COVID and flu were negative.  Troponin negative x 2.  CMP

## 2025-02-06 NOTE — PROGRESS NOTES
Pharmacy Home Medication Reconciliation Note    A medication reconciliation has been completed for Irvin Arreola 1950    Pharmacy: [facility] [address], [place], [place]  Information provided by: patient's  Flor Romero    The patient's home medication list is as follows:  No current facility-administered medications on file prior to encounter.     Current Outpatient Medications on File Prior to Encounter   Medication Sig Dispense Refill    irbesartan (AVAPRO) 150 MG tablet Take 1 tablet by mouth daily      NIFEdipine (ADALAT CC) 60 MG extended release tablet Take 1 tablet by mouth daily      tamsulosin (FLOMAX) 0.4 MG capsule Take 1 capsule by mouth daily      isosorbide mononitrate (IMDUR) 30 MG extended release tablet Take 1 tablet by mouth daily 30 tablet 3    torsemide (DEMADEX) 20 MG tablet Take 2 tablets by mouth daily (Patient taking differently: Take 1 tablet by mouth daily) 60 tablet 3    rosuvastatin (CRESTOR) 20 MG tablet Take 1 tablet by mouth nightly      potassium chloride (KLOR-CON) 10 MEQ extended release tablet Take 1 tablet by mouth daily      hydrALAZINE (APRESOLINE) 50 MG tablet Take 1 tablet by mouth 2 times daily           Timing of last doses updated.    Thank you,  Yamilet Reynaga, CHIOMAhT

## 2025-02-06 NOTE — H&P
V2.0  History and Physical      Name:  Irvin Arreola /Age/Sex: 1950  (74 y.o. male)   MRN & CSN:  [id number] & [id number] Encounter Date/Time: 2025 1:21 AM EST   Location:  ED- PCP: No primary care provider on file.       Date of Admission: 2025     Date of Service: Pt seen/examined on .25 . and placed in Observation.    Hospital Day: 2    Assessment and Plan:   Irvin Arreola is a 74 y.o. 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  Left leg weakness  Left hip pain  Hypertension  Tachycardia  Elevated WBC  Bilateral leg weakness    Plan:    Admit to [facility]  Consult orthopedic surgery for avascular necrosis on the bilateral femoral head  Pain control with IV morphine as needed  Both heart rate and WBC is elevated meeting SIRS criteria  He is given IV vancomycin and IV cefepime in the ED  However no source of infectious etiology based on history, exam or labs  Monitor off of antibiotics for now  Tachycardia could be due to pain and leukocytosis can be reactive  Send blood culture and check urinalysis  Patient unable to give his home medication list  Review home meds and reconcile in the morning when family is available  Consider MRI brain to rule out acute stroke if left-sided weakness remains significant despite pain control    Disposition:   Current Living situation: Home  Expected Disposition: Home  Estimated D/C: 2-3 days  PT/OT Eval Status:     Diet ADULT DIET; Regular   DVT Prophylaxis [x] Lovenox, []  Heparin, [] SCDs, [] Ambulation,  [] Eliquis, [] Xarelto, [] Coumadin   Code Status Full Code   Surrogate Decision Maker/ POA  PROVIDED HISTORY: Reason for exam:->inj Reason for Exam: inj FINDINGS: The joints are normal in alignment.  There are no fractures.  No lytic or blastic lesions are seen.  Joint space is maintained. No erosions or sclerosis.  Soft tissues unremarkable.  No radiopaque foreign bodies are seen.     No acute osseous abnormality.     XR TIBIA FIBULA LEFT (2 VIEWS)    Result Date: 2/5/2025  EXAMINATION: 4 XRAY VIEWS OF THE LEFT TIBIA AND FIBULA 2/5/2025 8:09 pm COMPARISON: None. HISTORY: ORDERING SYSTEM PROVIDED HISTORY: inj TECHNOLOGIST PROVIDED HISTORY: Reason for exam:->inj Reason for Exam: inj FINDINGS: The joints are normal in alignment.  There are no fractures.  No lytic or blastic lesions are seen.  Joint space is maintained. No erosions or sclerosis.  Soft tissues unremarkable.  No radiopaque foreign bodies are seen.     No acute osseous abnormality.         Electronically signed by Smiley Ross MD on 2/6/2025 at 1:21 AM

## 2025-02-06 NOTE — PROGRESS NOTES
[facility] - Inpatient Rehabilitation Department   Phone: [phone]    Occupational Therapy    [x] Initial Evaluation            [] Daily Treatment Note         [] Discharge Summary      Patient: Irvin Arreola   : 1950   MRN: [id number]   Date of Service:  2025    Admitting Diagnosis:  Frequent falls  Current Admission Summary: 74 y.o. male who presented to [facility] ER with complaints of leg pain and multiple falls recently. Poor historian. He does not appear to be very active. Describes pain in the left lateral hip of moderate intensity and of aching nature since 2-3 days ago which is relieved by rest. Denies new numbness/tingling. 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 Patient lives in AL facility and uses no assistive devices by his report to ambulate.    Past Medical History:  has a past medical history of JELENA (acute kidney injury) (HCC), Frequent falls, High cholesterol, Hypertension, Kidney stone, Leg swelling, and Suspected CHF (congestive heart failure).  Past Surgical History:  has a past surgical history that includes Appendectomy and Tonsillectomy.    Discharge Recommendations: Irvin Arreola scored a 15/24 on the AM-PAC ADL Inpatient form. Current research shows that an AM-PAC score of 17 or less is typically not associated with a discharge to the patient's home setting. Based on the patient's AM-PAC score and their current ADL deficits, it is recommended that the patient have 5-7 sessions per week of Occupational Therapy at d/c to increase the patient's independence.  At this time, this patient demonstrates complex nursing, medical, and rehabilitative needs, and would benefit from intensive rehabilitation services upon discharge from the Inpatient setting.  This patient demonstrates the ability to participate in and benefit from an intensive therapy program

## 2025-02-06 NOTE — ACP (ADVANCE CARE PLANNING)
Purpose of Encounter: Advanced care planning in light of hospitalization for fall, weakness  Parties in attendance: :  Anival Collins MD, patient's sister Linda  Decisional Capacity: No  Objective: to determine goals of care   Goals of Care Determinations: Patient reported he did not want resuscitation although it was unclear if he understood the whole concept of resuscitation, Sister Linda was at bedside, who was also POA and agreed with DNR CCA status  Code Status: Full  Time Spent on Advanced Planning Documents: 19  mins.  Advanced Care Planning Documents:  . POA sister Linda

## 2025-02-07 ENCOUNTER — APPOINTMENT (OUTPATIENT)
Dept: MRI IMAGING | Age: 75
DRG: 065 | End: 2025-02-07
Payer: MEDICARE

## 2025-02-07 LAB
ANION GAP SERPL CALCULATED.3IONS-SCNC: 11 MMOL/L (ref 3–16)
BUN SERPL-MCNC: 15 MG/DL (ref 7–20)
CALCIUM SERPL-MCNC: 8.9 MG/DL (ref 8.3–10.6)
CHLORIDE SERPL-SCNC: 104 MMOL/L (ref 99–110)
CHOLEST SERPL-MCNC: 112 MG/DL (ref 0–199)
CO2 SERPL-SCNC: 24 MMOL/L (ref 21–32)
CREAT SERPL-MCNC: 0.8 MG/DL (ref 0.8–1.3)
DEPRECATED RDW RBC AUTO: 13.3 % (ref 12.4–15.4)
EKG ATRIAL RATE: 119 BPM
EKG DIAGNOSIS: NORMAL
EKG P AXIS: 47 DEGREES
EKG P-R INTERVAL: 164 MS
EKG Q-T INTERVAL: 316 MS
EKG QRS DURATION: 80 MS
EKG QTC CALCULATION (BAZETT): 444 MS
EKG R AXIS: 20 DEGREES
EKG T AXIS: 72 DEGREES
EKG VENTRICULAR RATE: 119 BPM
EST. AVERAGE GLUCOSE BLD GHB EST-MCNC: 93.9 MG/DL
GFR SERPLBLD CREATININE-BSD FMLA CKD-EPI: >90 ML/MIN/{1.73_M2}
GLUCOSE SERPL-MCNC: 121 MG/DL (ref 70–99)
HBA1C MFR BLD: 4.9 %
HCT VFR BLD AUTO: 43.7 % (ref 40.5–52.5)
HDLC SERPL-MCNC: 43 MG/DL (ref 40–60)
HGB BLD-MCNC: 14.9 G/DL (ref 13.5–17.5)
LDLC SERPL CALC-MCNC: 56 MG/DL
MCH RBC QN AUTO: 31.4 PG (ref 26–34)
MCHC RBC AUTO-ENTMCNC: 34.1 G/DL (ref 31–36)
MCV RBC AUTO: 92 FL (ref 80–100)
PLATELET # BLD AUTO: 166 K/UL (ref 135–450)
PMV BLD AUTO: 9.6 FL (ref 5–10.5)
POTASSIUM SERPL-SCNC: 3.9 MMOL/L (ref 3.5–5.1)
RBC # BLD AUTO: 4.75 M/UL (ref 4.2–5.9)
SODIUM SERPL-SCNC: 139 MMOL/L (ref 136–145)
TRIGL SERPL-MCNC: 63 MG/DL (ref 0–150)
VLDLC SERPL CALC-MCNC: 13 MG/DL
WBC # BLD AUTO: 11.2 K/UL (ref 4–11)

## 2025-02-07 PROCEDURE — 92526 ORAL FUNCTION THERAPY: CPT

## 2025-02-07 PROCEDURE — 97112 NEUROMUSCULAR REEDUCATION: CPT

## 2025-02-07 PROCEDURE — 97116 GAIT TRAINING THERAPY: CPT

## 2025-02-07 PROCEDURE — 80048 BASIC METABOLIC PNL TOTAL CA: CPT

## 2025-02-07 PROCEDURE — 97535 SELF CARE MNGMENT TRAINING: CPT

## 2025-02-07 PROCEDURE — 85027 COMPLETE CBC AUTOMATED: CPT

## 2025-02-07 PROCEDURE — G0378 HOSPITAL OBSERVATION PER HR: HCPCS

## 2025-02-07 PROCEDURE — 96372 THER/PROPH/DIAG INJ SC/IM: CPT

## 2025-02-07 PROCEDURE — 2500000003 HC RX 250 WO HCPCS: Performed by: INTERNAL MEDICINE

## 2025-02-07 PROCEDURE — 83036 HEMOGLOBIN GLYCOSYLATED A1C: CPT

## 2025-02-07 PROCEDURE — 70551 MRI BRAIN STEM W/O DYE: CPT

## 2025-02-07 PROCEDURE — 97165 OT EVAL LOW COMPLEX 30 MIN: CPT

## 2025-02-07 PROCEDURE — 97530 THERAPEUTIC ACTIVITIES: CPT

## 2025-02-07 PROCEDURE — 92523 SPEECH SOUND LANG COMPREHEN: CPT

## 2025-02-07 PROCEDURE — 70544 MR ANGIOGRAPHY HEAD W/O DYE: CPT

## 2025-02-07 PROCEDURE — 6360000002 HC RX W HCPCS: Performed by: INTERNAL MEDICINE

## 2025-02-07 PROCEDURE — 70547 MR ANGIOGRAPHY NECK W/O DYE: CPT

## 2025-02-07 PROCEDURE — 6370000000 HC RX 637 (ALT 250 FOR IP): Performed by: STUDENT IN AN ORGANIZED HEALTH CARE EDUCATION/TRAINING PROGRAM

## 2025-02-07 PROCEDURE — 92610 EVALUATE SWALLOWING FUNCTION: CPT

## 2025-02-07 PROCEDURE — 6370000000 HC RX 637 (ALT 250 FOR IP): Performed by: NURSE PRACTITIONER

## 2025-02-07 PROCEDURE — 2500000003 HC RX 250 WO HCPCS: Performed by: STUDENT IN AN ORGANIZED HEALTH CARE EDUCATION/TRAINING PROGRAM

## 2025-02-07 PROCEDURE — 36415 COLL VENOUS BLD VENIPUNCTURE: CPT

## 2025-02-07 PROCEDURE — 93010 ELECTROCARDIOGRAM REPORT: CPT | Performed by: INTERNAL MEDICINE

## 2025-02-07 PROCEDURE — 80061 LIPID PANEL: CPT

## 2025-02-07 RX ORDER — HYDRALAZINE HYDROCHLORIDE 20 MG/ML
10 INJECTION INTRAMUSCULAR; INTRAVENOUS
Status: COMPLETED | OUTPATIENT
Start: 2025-02-07 | End: 2025-02-08

## 2025-02-07 RX ADMIN — ACETAMINOPHEN 1000 MG: 500 TABLET ORAL at 16:25

## 2025-02-07 RX ADMIN — ENOXAPARIN SODIUM 30 MG: 100 INJECTION SUBCUTANEOUS at 09:50

## 2025-02-07 RX ADMIN — ENOXAPARIN SODIUM 30 MG: 100 INJECTION SUBCUTANEOUS at 21:04

## 2025-02-07 RX ADMIN — ATORVASTATIN CALCIUM 80 MG: 80 TABLET, FILM COATED ORAL at 21:05

## 2025-02-07 RX ADMIN — ACETAMINOPHEN 1000 MG: 500 TABLET ORAL at 09:50

## 2025-02-07 RX ADMIN — ASPIRIN 81 MG: 81 TABLET, CHEWABLE ORAL at 09:50

## 2025-02-07 RX ADMIN — SODIUM CHLORIDE, PRESERVATIVE FREE 10 ML: 5 INJECTION INTRAVENOUS at 09:50

## 2025-02-07 RX ADMIN — ACETAMINOPHEN 1000 MG: 500 TABLET ORAL at 21:04

## 2025-02-07 RX ADMIN — Medication 10 ML: at 09:50

## 2025-02-07 RX ADMIN — SODIUM CHLORIDE, PRESERVATIVE FREE 10 ML: 5 INJECTION INTRAVENOUS at 21:05

## 2025-02-07 ASSESSMENT — PAIN DESCRIPTION - ORIENTATION
ORIENTATION: LEFT

## 2025-02-07 ASSESSMENT — PAIN DESCRIPTION - LOCATION
LOCATION: LEG

## 2025-02-07 ASSESSMENT — PAIN DESCRIPTION - DESCRIPTORS: DESCRIPTORS: ACHING

## 2025-02-07 ASSESSMENT — PAIN SCALES - GENERAL
PAINLEVEL_OUTOF10: 6
PAINLEVEL_OUTOF10: 6
PAINLEVEL_OUTOF10: 7

## 2025-02-07 NOTE — PLAN OF CARE
Problem: Chronic Conditions and Co-morbidities  Goal: Patient's chronic conditions and co-morbidity symptoms are monitored and maintained or improved  Recent Flowsheet Documentation  Taken 2/6/2025 2115 by Rick Santos RN  Care Plan - Patient's Chronic Conditions and Co-Morbidity Symptoms are Monitored and Maintained or Improved: Monitor and assess patient's chronic conditions and comorbid symptoms for stability, deterioration, or improvement     Problem: Discharge Planning  Goal: Discharge to home or other facility with appropriate resources  Recent Flowsheet Documentation  Taken 2/6/2025 2115 by Rick Santos RN  Discharge to home or other facility with appropriate resources: Identify barriers to discharge with patient and caregiver     Problem: Respiratory - Adult  Goal: Achieves optimal ventilation and oxygenation  Recent Flowsheet Documentation  Taken 2/6/2025 2115 by Rick Santos RN  Achieves optimal ventilation and oxygenation: Assess for changes in respiratory status     Problem: Cardiovascular - Adult  Goal: Maintains optimal cardiac output and hemodynamic stability  Recent Flowsheet Documentation  Taken 2/6/2025 2115 by Rick Santos RN  Maintains optimal cardiac output and hemodynamic stability: Monitor blood pressure and heart rate     Problem: Musculoskeletal - Adult  Goal: Return ADL status to a safe level of function  Recent Flowsheet Documentation  Taken 2/6/2025 2115 by Rick Santos RN  Return ADL Status to a Safe Level of Function: Obtain physical therapy/occupational therapy consults as needed

## 2025-02-07 NOTE — PROGRESS NOTES
Date of Admission: 2/5/2025. Hospital Day: 3       HOSPITAL COURSE  74 M   w dementia admitted for acute weakness, found to have  acute pontine infarct    Interval  History:   Report same   lt leg weakness as yesterday and l hip pain .otherwise no complaints      Physical Exam     BP (!) 157/89   Pulse 95   Temp 97.9 °F (36.6 °C) (Oral)   Resp 17   Ht 1.676 m (5' 6\")   Wt 91.7 kg (202 lb 2.6 oz)   SpO2 94%   BMI 32.63 kg/m²     General appearance: No apparent distress, appears stated age and cooperative.  Respiratory:  Normal respiratory effort. Clear to auscultation, bilaterally without Rales/Wheezes/Rhonchi.  Cardiovascular: Regular rate and rhythm with normal S1/S2 without murmurs, rubs or gallops.  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 No intervention needed for now as per ortho    #leukocytosis  Improving. No infection apparent. Blood cx, -ve, procal wnl                Active Hospital Problems    Diagnosis     Frequent falls [R29.6]     Primary hypertension [I10]     Tachycardia [R00.0]     Leg swelling [M79.89]            DVT Prophylaxis:    Diet: ADULT DIET; Regular  Code Status: DNR-CCA      Dispo - aru .  Expected DC  in/on   .  Barrier to discharge           Chief Complaint:   Chief Complaint   Patient presents with    Fall     Pt came in from [facility] via [facility], pt reports 2 falls today, pt denies hitting head, pt reports all over left sided pain. Pt denies blood thinner use.             Medications:  Reviewed    Infusion Medications    sodium chloride      sodium chloride       Scheduled    Final Result   No acute abnormality of the cervical spine.         CT HEAD WO CONTRAST   Final Result   No acute intracranial abnormality.             IP CONSULT TO ORTHOPEDIC SURGERY  IP CONSULT TO CASE MANAGEMENT  IP CONSULT TO PHYSICAL MEDICINE REHAB  IP CONSULT TO NEUROLOGY      Anival Collins MD

## 2025-02-07 NOTE — CARE COORDINATION
Prior Authorization submitted for ARU approval with a reference number: [id number]      ARU will continue to follow progress and update discharge plan as needed.    RYLAND ZunigaN, .[phone]

## 2025-02-07 NOTE — PROGRESS NOTES
Speech Language Pathology  [facility] - Inpatient Rehabilitation Services  [phone]  SLP Clinical Swallow Evaluation and Speech Language Cognitive Assessment       Patient: Irvin Arreola   : 1950   MRN: [id number]      Evaluation Date: 2025      Admitting Dx: Falls [R29.6]  General weakness [R53.1]  SIRS (systemic inflammatory response syndrome) (HCC) [R65.10]  Frequent falls [R29.6]  Treatment Diagnosis: Cognitive-Linguistic Deficits , Speech Language Deficits , Oropharyngeal Dysphagia   Pain: Denies                                  Recommendations      Recommended Diet and Intervention 2025:  Diet Solids Recommendation:  Regular texture diet  Liquid Consistency Recommendation:  Thin liquids  Recommended form of Meds: Meds whole with water         Compensatory strategies: Alternate solids/liquids , Upright as possible with all PO intake , Small bites/sips , Eat/feed slowly, Remain upright 30-45 min , Aspiration Precautions     Discharge Recommendations:  Recommend ongoing SLP for speech and dysphagia therapy upon discharge from hospital     History/Course of Treatment     H&P: \"Irvin Arreola is 74 y.o. male with history of hypertension and bilateral leg swelling  He now presents to the ER with complaint of frequent falls for the past 3 days  He is unable to ambulate even short distances such as from his bed to the door  He has bilateral hip pain which is worse on the left side  On exam he is not able to bend the left hip or knee because of pain  He also thinks the left side is more weak than the right  He does not know the year or where he is and unable to give list of his medication  He denies syncope or lightheadedness\"    Imaging:  CT Chest:   IMPRESSION:  No evidence of pulmonary embolism or acute pulmonary abnormality.     Extensive coronary arterial calcifications.  MRI:  IMPRESSION:  1. Acute lacunar infarct involving the right paramedian lacie.  2. Severe chronic microvascular  pharyngeal pooling  S/s of delayed pharyngeal clearing   Coughing     Eating Assistance:   Setup or clean-up assistance              SPEECH LANGUAGE COGNITIVE ASSESSMENT:     Speech Diagnosis:   Cognitive-Linguistic Deficits , Speech Language Deficits     Impressions: Patient was evaluated via skilled informal evaluation derived from portions of various standardized assessments  . Pt was grossly able to follow functional 1-2 step commands and respond to functional questions. He benefited from repetition at times. Pt is [place] at baseline. No aphasia was assessed pt was grossly able to communicate wants/needs. He reported change to his speech sound. Mild dysarthria was assessed with decreased articulatory precision and reduced vocal volume. Pt was oriented to person and place. Demonstrated decreased short term recall, decreased recall of new learning. Cognitive baseline is unclear, per chart pt with some dementia at baseline. Unable to confirm pt baseline at this time however, based on assessment suspect decline in speech language and cognitive linguistic skills . . Based on today's assessment recommend speech language therapy to address deficits and facilitate return to prior level of function with ongoing assessment of cognitive linguistic skills with further goals to be established .      COMPREHENSION  Auditory Comprehension: Within functional limits   Functional Ability to Comprehend Basic Commands/Questions     EXPRESSION  Verbal Expression: Within functional limits      Pragmatics/Social Functioning: Mild   Emotionally labile      MOTOR SPEECH  Motor Speech: Mild   Dysarthria: Blended word boundaries  and Imprecise articulation      VOICE  Voice: Within functional limits        COGNITION    Overall Orientation : Mild    Disoriented to time   Oriented to self  Oriented to place    Attention: To be assessed           Memory: Mild    Impaired Short-term Memory  Impaired Recall of New Learning   Impaired

## 2025-02-07 NOTE — CARE COORDINATION
Discharge Planning Assessment:     Patient admitted as Observation/OPIB with an anticipated short hospitalization length of stay. Chart reviewed and it appears that patient has - None needs at this time. Discussed with patient’s nurse and requested that case management be notified when /if additional discharge needs are identified.     *Case management will continue to follow progress and update discharge plan as needed.    CM called [facility] [phone], no answer, LVM with CB information.     Electronically signed by Shakir Rainey on 2/7/2025 at 2:50 PM

## 2025-02-07 NOTE — PROGRESS NOTES
Occupational  and Physical Therapy    Irvin Arreola     Pt currently going [facility] for MRI. Will follow up with patient as appropriate.     Kim Mcclain, M/OT, OTR/L- [id number]

## 2025-02-07 NOTE — PROGRESS NOTES
[facility] - Inpatient Rehabilitation Department   Phone: [phone]    Occupational Therapy    [] Initial Evaluation            [x] Daily Treatment Note         [] Discharge Summary      Patient: Irvin Arreola   : 1950   MRN: [id number]   Date of Service:  2025    Admitting Diagnosis:  Frequent falls  Current Admission Summary: 74 y.o. male who presented to [facility] ER with complaints of leg pain and multiple falls recently. Poor historian. He does not appear to be very active. Describes pain in the left lateral hip of moderate intensity and of aching nature since 2-3 days ago which is relieved by rest. Denies new numbness/tingling. 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 Patient lives in AL facility and uses no assistive devices by his report to ambulate.    MRI (+) Acute lacunar infarct involving the right paramedian lacie.   Past Medical History:  has a past medical history of JELENA (acute kidney injury) (HCC), Frequent falls, High cholesterol, Hypertension, Kidney stone, Leg swelling, and Suspected CHF (congestive heart failure).  Past Surgical History:  has a past surgical history that includes Appendectomy and Tonsillectomy.    Discharge Recommendations: Irvin Arreola scored a 15/24 on the AM-PAC ADL Inpatient form. Current research shows that an AM-PAC score of 17 or less is typically not associated with a discharge to the patient's home setting. Based on the patient's AM-PAC score and their current ADL deficits, it is recommended that the patient have 5-7 sessions per week of Occupational Therapy at d/c to increase the patient's independence.  At this time, this patient demonstrates complex nursing, medical, and rehabilitative needs, and would benefit from intensive rehabilitation services upon discharge from the Inpatient setting.  This patient demonstrates the  ability to participate in and benefit from an intensive therapy program with a coordinated interdisciplinary team approach to foster frequent, structured, and documented communication among disciplines, who will work together to establish, prioritize, and achieve treatment goals. Please see assessment section for further patient specific details.    If patient discharges prior to next session this note will serve as a discharge summary.  Please see below for the latest assessment towards goals.       DME Required For Discharge: DME to be determined at next level of care, DME to be determined pending patient progress    Precautions/Restrictions: high fall risk     Required Braces/Orthotics: no braces required   [] Right  [] Left  Positional Restrictions:no positional restrictions    Pre-Admission Information (Patient is a questionable historian, [facility] contacted and confirmed information below)   Lives With: alone                     Type of Home:  [facility]   Home Layout: one level  Home Access: level entry  Bathroom Layout: walk in shower  Bathroom Equipment: grab bars in shower, grab bars around toilet, shower chair, hand held shower head  Toilet Height: elevated height  Home Equipment: sock aide  Transfer Assistance: Independent without use of device  Ambulation Assistance:Independent without use of device  ADL Assistance: independent with all ADL's  IADL Assistance: requires assistance with meal prep, requires assistance with laundry, requires assistance with vacuuming, requires assistance with cleaning, requires assistance with driving/transportation  Active :        [] Yes                 [x] No (Reports that his sister drives)   Hand Dominance: [] Left                 [x] Right  Current Employment: retired.  Occupation: Nursing assistant   Recent Falls: Admitted for frequent falls. Patient reports 2 falls within the last 6 months      Available Assistance at Discharge: 24 hr

## 2025-02-07 NOTE — PROGRESS NOTES
[facility] - Inpatient Rehabilitation Department   Phone: [phone]    Physical Therapy    [] Initial Evaluation            [x] Daily Treatment Note         [] Discharge Summary      Patient: Irvin Arreola   : 1950   MRN: [id number]   Date of Service:  2025  Admitting Diagnosis: Frequent falls  Current Admission Summary: Irvin Arreola is a 74 y.o. male who presented to [facility] ER with complaints of leg pain and multiple falls recently. Poor historian. He does not appear to be very active. Describes pain in the left lateral hip of moderate intensity and of aching nature since 2-3 days ago which is relieved by rest. Denies new numbness/tingling. 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 Patient lives in AL facility and uses no assistive devices by his report to ambulate.  MRI: Acute lacunar infarct involving the right paramedian lacie.   Past Medical History:  has a past medical history of JELENA (acute kidney injury) (HCC), Frequent falls, High cholesterol, Hypertension, Kidney stone, Leg swelling, and Suspected CHF (congestive heart failure).  Past Surgical History:  has a past surgical history that includes Appendectomy and Tonsillectomy.  Discharge Recommendations: Irvin Arreola scored a 17/24 on the AM-PAC short mobility form. Current research shows that an AM-PAC score of 17 or less is typically not associated with a discharge to the patient's home setting. Based on the patient's AM-PAC score and their current functional mobility deficits, it is recommended that the patient have 5-7 sessions per week of Physical Therapy at d/c to increase the patient's independence.  At this time, this patient demonstrates complex nursing, medical, and rehabilitative needs, and would benefit from intensive rehabilitation services upon discharge from the Inpatient setting.  This patient  Sitting Balance: fair: maintains balance at CGA without use of UE support  Static Standing Balance: fair (-): maintains balance at CGA with use of UE support  Dynamic Standing Balance: poor (+): requires min (A) to maintain balance  Comments:    Other Therapeutic Interventions  Neuro Re-Education:   -Jessica RAY push/pulls with therapist in [place] ~7' fwd/bwkd x2 trials with CGA for pushing forward and min A for retrowalking   -D1/D2 flexion/extension with (B) UE holding object x5 in ea direction. Verbal cueing to visually track object and to increase trunk rotation   -Trail making tasks x2 in stance with unilateral UE support. Minimal verbal cueing to complete  -Sit-to-stands from recliner x5 while holding object to promote increased use of (B) LE to facilitate transfers     Functional Outcomes  -PAC Inpatient Mobility Raw Score : 17              Cognition  Overall Cognitive Status: Impaired  Arousal/Alertness: appropriate responses to stimuli  Following Commands: follows one step commands with repetition, follows one step commands with increased time  Attention Span: attends with cues to redirect  Memory: decreased recall of recent events  Safety Judgement: decreased awareness of need for assistance, decreased awareness of need for safety  Insights: decreased awareness of deficits  Initiation: requires cues for some  Sequencing: requires cues for some  Orientation:    oriented to person, oriented to place, oriented to situation, and disoriented to time   Command Following:   accurately follows one step commands    Education  Barriers To Learning: cognition, hearing, and emotional  Patient Education: patient educated on goals, PT role and benefits, plan of care, general safety, functional mobility training, proper use of assistive device/equipment, transfer training, discharge recommendations  Learning Assessment:  patient will require reinforcement due to cognitive deficits    Assessment  Activity Tolerance: Good;

## 2025-02-08 PROBLEM — I63.9 ACUTE CVA (CEREBROVASCULAR ACCIDENT) (HCC): Status: ACTIVE | Noted: 2025-02-08

## 2025-02-08 LAB
BASOPHILS # BLD: 0.1 K/UL (ref 0–0.2)
BASOPHILS NFR BLD: 0.6 %
DEPRECATED RDW RBC AUTO: 13.3 % (ref 12.4–15.4)
EOSINOPHIL # BLD: 0.4 K/UL (ref 0–0.6)
EOSINOPHIL NFR BLD: 3.5 %
HCT VFR BLD AUTO: 46.7 % (ref 40.5–52.5)
HGB BLD-MCNC: 15.9 G/DL (ref 13.5–17.5)
LYMPHOCYTES # BLD: 2.2 K/UL (ref 1–5.1)
LYMPHOCYTES NFR BLD: 20.6 %
MCH RBC QN AUTO: 31.1 PG (ref 26–34)
MCHC RBC AUTO-ENTMCNC: 34 G/DL (ref 31–36)
MCV RBC AUTO: 91.7 FL (ref 80–100)
MONOCYTES # BLD: 0.8 K/UL (ref 0–1.3)
MONOCYTES NFR BLD: 7.9 %
NEUTROPHILS # BLD: 7 K/UL (ref 1.7–7.7)
NEUTROPHILS NFR BLD: 67.4 %
PLATELET # BLD AUTO: 218 K/UL (ref 135–450)
PMV BLD AUTO: 9.4 FL (ref 5–10.5)
RBC # BLD AUTO: 5.09 M/UL (ref 4.2–5.9)
WBC # BLD AUTO: 10.4 K/UL (ref 4–11)

## 2025-02-08 PROCEDURE — 36415 COLL VENOUS BLD VENIPUNCTURE: CPT

## 2025-02-08 PROCEDURE — 99223 1ST HOSP IP/OBS HIGH 75: CPT | Performed by: PSYCHIATRY & NEUROLOGY

## 2025-02-08 PROCEDURE — 6360000002 HC RX W HCPCS: Performed by: INTERNAL MEDICINE

## 2025-02-08 PROCEDURE — 6360000002 HC RX W HCPCS: Performed by: NURSE PRACTITIONER

## 2025-02-08 PROCEDURE — 1200000000 HC SEMI PRIVATE

## 2025-02-08 PROCEDURE — 96375 TX/PRO/DX INJ NEW DRUG ADDON: CPT

## 2025-02-08 PROCEDURE — 6370000000 HC RX 637 (ALT 250 FOR IP): Performed by: NURSE PRACTITIONER

## 2025-02-08 PROCEDURE — 85025 COMPLETE CBC W/AUTO DIFF WBC: CPT

## 2025-02-08 PROCEDURE — 6370000000 HC RX 637 (ALT 250 FOR IP): Performed by: STUDENT IN AN ORGANIZED HEALTH CARE EDUCATION/TRAINING PROGRAM

## 2025-02-08 PROCEDURE — 2500000003 HC RX 250 WO HCPCS: Performed by: STUDENT IN AN ORGANIZED HEALTH CARE EDUCATION/TRAINING PROGRAM

## 2025-02-08 PROCEDURE — 96372 THER/PROPH/DIAG INJ SC/IM: CPT

## 2025-02-08 RX ORDER — LOSARTAN POTASSIUM 25 MG/1
50 TABLET ORAL DAILY
Status: DISCONTINUED | OUTPATIENT
Start: 2025-02-08 | End: 2025-02-14 | Stop reason: HOSPADM

## 2025-02-08 RX ORDER — POLYETHYLENE GLYCOL 3350 17 G/17G
17 POWDER, FOR SOLUTION ORAL DAILY
Status: DISCONTINUED | OUTPATIENT
Start: 2025-02-08 | End: 2025-02-14 | Stop reason: HOSPADM

## 2025-02-08 RX ADMIN — ENOXAPARIN SODIUM 30 MG: 100 INJECTION SUBCUTANEOUS at 09:15

## 2025-02-08 RX ADMIN — CALCIUM POLYCARBOPHIL 625 MG: 625 TABLET ORAL at 15:35

## 2025-02-08 RX ADMIN — ASPIRIN 81 MG: 81 TABLET, CHEWABLE ORAL at 09:15

## 2025-02-08 RX ADMIN — LOSARTAN POTASSIUM 50 MG: 25 TABLET, FILM COATED ORAL at 16:25

## 2025-02-08 RX ADMIN — ACETAMINOPHEN 1000 MG: 500 TABLET ORAL at 20:38

## 2025-02-08 RX ADMIN — SODIUM CHLORIDE, PRESERVATIVE FREE 10 ML: 5 INJECTION INTRAVENOUS at 20:39

## 2025-02-08 RX ADMIN — ATORVASTATIN CALCIUM 80 MG: 80 TABLET, FILM COATED ORAL at 20:39

## 2025-02-08 RX ADMIN — POLYETHYLENE GLYCOL 3350 17 G: 17 POWDER, FOR SOLUTION ORAL at 15:35

## 2025-02-08 RX ADMIN — ACETAMINOPHEN 1000 MG: 500 TABLET ORAL at 15:35

## 2025-02-08 RX ADMIN — SODIUM CHLORIDE, PRESERVATIVE FREE 10 ML: 5 INJECTION INTRAVENOUS at 09:15

## 2025-02-08 RX ADMIN — SODIUM CHLORIDE, PRESERVATIVE FREE 10 ML: 5 INJECTION INTRAVENOUS at 03:30

## 2025-02-08 RX ADMIN — HYDRALAZINE HYDROCHLORIDE 10 MG: 20 INJECTION INTRAMUSCULAR; INTRAVENOUS at 03:29

## 2025-02-08 RX ADMIN — ENOXAPARIN SODIUM 30 MG: 100 INJECTION SUBCUTANEOUS at 20:38

## 2025-02-08 RX ADMIN — ACETAMINOPHEN 1000 MG: 500 TABLET ORAL at 09:15

## 2025-02-08 ASSESSMENT — PAIN DESCRIPTION - LOCATION
LOCATION: LEG

## 2025-02-08 ASSESSMENT — PAIN DESCRIPTION - ORIENTATION
ORIENTATION: LEFT

## 2025-02-08 ASSESSMENT — PAIN SCALES - GENERAL
PAINLEVEL_OUTOF10: 6
PAINLEVEL_OUTOF10: 7
PAINLEVEL_OUTOF10: 6
PAINLEVEL_OUTOF10: 7

## 2025-02-08 NOTE — FLOWSHEET NOTE
02/07/25 8477   Treatment Team Notification   Reason for Communication Abnormal vitals;Evaluate   Name of Team Member Notified Senia Valdovinos-Manuel   Treatment Team Role Advanced Practice Nurse   Method of Communication Secure Message   Response Waiting for response

## 2025-02-08 NOTE — CONSULTS
In patient Neurology consult        [facility] Neurology      MD Irvin Catalan  1950    Date of Service: 2/8/2025    Referring Physician: Anival Collins MD    Most of the history was obtained from detailed chart reviewing and discussion with the patient's nurse. The patient is currently confused and unable to provide me with accurate history.     Reason for the consult and CC: Acute recurrent falling and new stroke    HPI:   The patient is a 74 y.o.  years old male with history of hypertension, CHF and other medical problems who was admitted 2 days ago with leg pain and recurrent falling.  He was seen by orthopedic and diagnosed with femur AVN.  Further imaging with MRI showed acute right pontine stroke.  Neurology was consulted.  Patient is currently by himself.  Waxing waning but able to answer questions.  Appears to be aphasic and dysarthric.  Degree is severe Duration was persistent.  No other associated symptoms.  Other review of system was limited.        I personally reviewed and updated social history, past medical history, medications, allergy, surgical history, and family history as documented in the patient's electronic health records.       ROS: 10-14 system review, reviewed with patient's family and/or nurse was unremarkable except mentioned in HPI.     Constitutional:   Vitals:    02/08/25 0730 02/08/25 1107 02/08/25 1109 02/08/25 1130   BP: (!) 177/93  (!) 170/111 (!) 153/90   Pulse: 80  77 76   Resp: 16  18    Temp: 97.9 °F (36.6 °C)  98.1 °F (36.7 °C)    TempSrc: Oral  Oral    SpO2: 92%  93%    Weight:  91.5 kg (201 lb 11.5 oz)     Height:          General appearance: Confused   Mental Status:   AAO times one.  Himself  Attention and concentration: poor, waxing and waning.   Language: Nonfluent, dysphonic and dysphasic.    Recent memory: Impaired  Remote memory: Impaired  Poor fund of knowledge  Cranial Nerves:   II: Pupils: equal, round, reactive to  AM    CO2 24 02/07/2025 04:26 AM    BUN 15 02/07/2025 04:26 AM    CREATININE 0.8 02/07/2025 04:26 AM    GFRAA >60 10/20/2018 05:52 PM    LABGLOM >90 02/07/2025 04:26 AM    LABGLOM >60 06/26/2023 10:54 AM    GLUCOSE 121 02/07/2025 04:26 AM    PHOS 2.7 06/26/2023 10:54 AM    MG 2.10 06/12/2023 05:04 AM    CALCIUM 8.9 02/07/2025 04:26 AM     Lab Results   Component Value Date/Time    WBC 11.2 02/07/2025 04:26 AM    RBC 4.75 02/07/2025 04:26 AM    HGB 14.9 02/07/2025 04:26 AM    HCT 43.7 02/07/2025 04:26 AM    MCV 92.0 02/07/2025 04:26 AM    RDW 13.3 02/07/2025 04:26 AM     02/07/2025 04:26 AM   No results found for: \"INR\", \"PROTIME\"      Impression:  Acute right ischemic pontine stroke, severe, not controlled: Could be thromboembolic from poorly controlled hypertension  Recurrent fall, sensory ataxia, recent stroke and baseline dementia  Hypertension, not controlled  Hyperlipidemia      Recommendation:  MRA showed intracranial stenosis  Aspirin  Statin  Lipid panel  A1c  Aspiration precautions  Echo  Telemetry  DVT and GI prophylaxis  High risk patient for stroke recurrence  Goal inpatient blood pressure 140-160.  Can restart blood pressure medication if above 180  Stroke education provided  PT, OT and speech  Sliding scale blood sugar monitor  DC planning after the above workup  High risk for hospital-acquired delirium      Thank you for referring such patient. If you have any questions regarding my consult note, please don't hesitate to call me.     Anabelle Nowak MD  [phone]    This dictation was generated by voice recognition computer software. Although all attempts are made to edit the dictation for accuracy, there may be errors in the  transcription that are not intended

## 2025-02-08 NOTE — PROGRESS NOTES
Shift assessment complete. BP elevated but all other vital signs stable. Telemetry on. NSR on telemetry. Respirations even and unlabored. NIHSS complete. Medications given per MAR. Pt denies any further needs. Call light within reach. Bed alarm engaged.

## 2025-02-08 NOTE — CARE COORDINATION
02/08/25 1430   IMM Letter   IMM Letter given to Patient/Family/Significant other/Guardian/POA/by: IMM given to pt, pt is in agreement with plan   IMM Letter date given: 02/08/25   IMM Letter time given: 1585     Electronically signed by Shakir Rainey on 2/8/2025 at 2:31 PM

## 2025-02-08 NOTE — PROGRESS NOTES
Date of Admission: 2/5/2025. Hospital Day: 4       HOSPITAL COURSE  74 M   w dementia admitted for acute weakness, found to have  acute pontine infarct    Interval  History:   Report same   lt leg weakness as yesterday and l hip pain .otherwise no complaints      Physical Exam     BP (!) 153/90   Pulse 76   Temp 98.1 °F (36.7 °C) (Oral)   Resp 18   Ht 1.676 m (5' 6\")   Wt 91.5 kg (201 lb 11.5 oz)   SpO2 93%   BMI 32.56 kg/m²     General appearance: No apparent distress, appears stated age and cooperative.  Respiratory:  Normal respiratory effort. Clear to auscultation, bilaterally without Rales/Wheezes/Rhonchi.  Cardiovascular: Regular rate and rhythm with normal S1/S2 without murmurs, rubs or gallops.  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 No intervention needed for now as per ortho    #leukocytosis  Improving.  Suspect reactive .No infection apparent. Blood cx, -ve, procal wnl    HTN  Goal BP  < 160/90. Elevated today. Out of permissive htn range. Start losartan 50             Active Hospital Problems    Diagnosis     Acute CVA (cerebrovascular accident) (HCC) [I63.9]     Frequent falls [R29.6]     Primary hypertension [I10]     Tachycardia [R00.0]     Leg swelling [M79.89]            DVT Prophylaxis:    Diet: ADULT DIET; Regular  Code Status: DNR-CCA      Dispo - aru .  Expected DC  in/on   .  Barrier to discharge           Chief Complaint:   Chief Complaint   Patient presents with    Fall     [facility] pt reports 2 falls today, pt denies  Result   1. Acute lacunar infarct involving the right paramedian lacie.   2. Severe chronic microvascular ischemic changes.   3. No significant stenosis of the major arterial vessels of the head and neck.   The findings were sent to the Radiology Results Communication Center at 1:30   pm on 2/7/2025 to be communicated to a licensed caregiver.         MRA HEAD WO CONTRAST   Final Result   1. Acute lacunar infarct involving the right paramedian lacie.   2. Severe chronic microvascular ischemic changes.   3. No significant stenosis of the major arterial vessels of the head and neck.   The findings were sent to the Radiology Results Communication Center at 1:30   pm on 2/7/2025 to be communicated to a licensed caregiver.         MRI BRAIN WO CONTRAST   Final Result   1. Acute lacunar infarct involving the right paramedian lacie.   2. Severe chronic microvascular ischemic changes.   3. No significant stenosis of the major arterial vessels of the head and neck.   The findings were sent to the Radiology Results Communication Center at 1:30   pm on 2/7/2025 to be communicated to a licensed caregiver.         CT ABDOMEN PELVIS W IV CONTRAST Additional Contrast? None   Final Result   1. No acute traumatic injury involving the abdomen or pelvis   2. Diffuse hepatic steatosis   3. Enlarged prostate gland   4. 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      Extensive coronary arterial calcifications.         CT LUMBAR SPINE BONY RECONSTRUCTION   Final Result   No evidence of an acute fracture or subluxation of the lumbar spine.         CT THORACIC SPINE BONY RECONSTRUCTION   Final Result   No evidence of an acute fracture or traumatic malalignment involving the   thoracic spine         XR HIP 2-3 VW W PELVIS LEFT   Final Result   Osteopenia. No evidence of an acute fracture or traumatic

## 2025-02-09 LAB — BACTERIA BLD CULT: NORMAL

## 2025-02-09 PROCEDURE — 2500000003 HC RX 250 WO HCPCS: Performed by: STUDENT IN AN ORGANIZED HEALTH CARE EDUCATION/TRAINING PROGRAM

## 2025-02-09 PROCEDURE — 6370000000 HC RX 637 (ALT 250 FOR IP): Performed by: NURSE PRACTITIONER

## 2025-02-09 PROCEDURE — 6370000000 HC RX 637 (ALT 250 FOR IP): Performed by: STUDENT IN AN ORGANIZED HEALTH CARE EDUCATION/TRAINING PROGRAM

## 2025-02-09 PROCEDURE — 6360000002 HC RX W HCPCS: Performed by: INTERNAL MEDICINE

## 2025-02-09 PROCEDURE — 1200000000 HC SEMI PRIVATE

## 2025-02-09 RX ORDER — ASPIRIN 81 MG/1
81 TABLET ORAL DAILY
Qty: 90 TABLET | Refills: 0 | Status: SHIPPED | OUTPATIENT
Start: 2025-02-09

## 2025-02-09 RX ORDER — NIFEDIPINE 30 MG
60 TABLET, EXTENDED RELEASE ORAL DAILY
Status: DISCONTINUED | OUTPATIENT
Start: 2025-02-09 | End: 2025-02-10

## 2025-02-09 RX ORDER — POLYETHYLENE GLYCOL 3350 17 G/17G
17 POWDER, FOR SOLUTION ORAL DAILY
Qty: 30 PACKET | Refills: 0 | Status: SHIPPED | OUTPATIENT
Start: 2025-02-10 | End: 2025-03-12

## 2025-02-09 RX ADMIN — SODIUM CHLORIDE, PRESERVATIVE FREE 10 ML: 5 INJECTION INTRAVENOUS at 20:01

## 2025-02-09 RX ADMIN — ATORVASTATIN CALCIUM 80 MG: 80 TABLET, FILM COATED ORAL at 20:00

## 2025-02-09 RX ADMIN — ACETAMINOPHEN 1000 MG: 500 TABLET ORAL at 20:00

## 2025-02-09 RX ADMIN — DICLOFENAC SODIUM 2 G: 10 GEL TOPICAL at 23:19

## 2025-02-09 RX ADMIN — TRAMADOL HYDROCHLORIDE 50 MG: 50 TABLET, COATED ORAL at 00:12

## 2025-02-09 RX ADMIN — ACETAMINOPHEN 1000 MG: 500 TABLET ORAL at 10:15

## 2025-02-09 RX ADMIN — ACETAMINOPHEN 1000 MG: 500 TABLET ORAL at 16:25

## 2025-02-09 RX ADMIN — CALCIUM POLYCARBOPHIL 625 MG: 625 TABLET ORAL at 10:15

## 2025-02-09 RX ADMIN — ENOXAPARIN SODIUM 30 MG: 100 INJECTION SUBCUTANEOUS at 20:00

## 2025-02-09 RX ADMIN — ASPIRIN 81 MG: 81 TABLET, CHEWABLE ORAL at 10:15

## 2025-02-09 RX ADMIN — LOSARTAN POTASSIUM 50 MG: 25 TABLET, FILM COATED ORAL at 07:00

## 2025-02-09 RX ADMIN — SODIUM CHLORIDE, PRESERVATIVE FREE 10 ML: 5 INJECTION INTRAVENOUS at 10:15

## 2025-02-09 RX ADMIN — ENOXAPARIN SODIUM 30 MG: 100 INJECTION SUBCUTANEOUS at 10:15

## 2025-02-09 ASSESSMENT — PAIN SCALES - GENERAL
PAINLEVEL_OUTOF10: 7
PAINLEVEL_OUTOF10: 7
PAINLEVEL_OUTOF10: 9
PAINLEVEL_OUTOF10: 7

## 2025-02-09 ASSESSMENT — PAIN DESCRIPTION - ORIENTATION
ORIENTATION: LEFT

## 2025-02-09 ASSESSMENT — PAIN DESCRIPTION - LOCATION
LOCATION: HIP;LEG
LOCATION: LEG
LOCATION: LEG

## 2025-02-09 ASSESSMENT — PAIN DESCRIPTION - DESCRIPTORS: DESCRIPTORS: ACHING;CRAMPING

## 2025-02-09 NOTE — PROGRESS NOTES
Date of Admission: 2/5/2025. Hospital Day: 5       HOSPITAL COURSE  74 M   w dementia admitted for acute weakness, found to have  acute pontine infarct    Interval  History:   Report same   lt leg weakness as yesterday and l hip pain .otherwise no complaints      Physical Exam     BP (!) 134/90   Pulse 80   Temp 98 °F (36.7 °C) (Oral)   Resp 16   Ht 1.676 m (5' 6\")   Wt 91.5 kg (201 lb 11.5 oz)   SpO2 95%   BMI 32.56 kg/m²     General appearance: No apparent distress, appears stated age and cooperative.  Respiratory:  Normal respiratory effort. Clear to auscultation, bilaterally without Rales/Wheezes/Rhonchi.  Cardiovascular: Regular rate and rhythm with normal S1/S2 without murmurs, rubs or gallops.  Neuro  : lt leg  power 4/5,  RL,  RUSSEL and RU 5/5        Assessment/Plan:    #Acute  rt pontine stroke with left sided weakness  CTH  with no acute finding  MRI brain 2/7  show acute  rtpontine infarct  MRA head and neck without critical stenosis or occulsion except intracranial stenosis  Echo limited ordered  Tele ,ASA, statin  Lipid panel ,Hemoglobin A1c 4.3  Pt ot  speech  Neuroconsult, aru consult    HTN  Remain elevated.  Was 170/109 this morning  Goal BP  < 160/90. Elevated today. Out of permissive htn range. Start losartan 50, add nifedipine 30 mg.  Ordered blood pressure 140 to 160 for next few weeks      #Left hip pain  Has bl arthritis with AVN  . No intervention needed for now as per ortho    #leukocytosis  Improving.  Suspect reactive .No infection apparent. Blood cx, -ve, procal wnl                Active Hospital Problems    Diagnosis     Acute CVA (cerebrovascular accident) (HCC) [I63.9]     Frequent falls [R29.6]     Primary hypertension [I10]     Tachycardia [R00.0]     Leg swelling [M79.89]            DVT Prophylaxis:    Diet: ADULT DIET; Regular  Code Status: DNR-CCA      Dispo - aru .  Expected DC  in/on   .  Barrier to discharge           Chief Complaint:   Chief Complaint   Patient

## 2025-02-10 ENCOUNTER — APPOINTMENT (OUTPATIENT)
Age: 75
DRG: 065 | End: 2025-02-10
Attending: STUDENT IN AN ORGANIZED HEALTH CARE EDUCATION/TRAINING PROGRAM
Payer: MEDICARE

## 2025-02-10 LAB
BACTERIA BLD CULT ORG #2: NORMAL
ECHO AO ASC DIAM: 3.6 CM
ECHO AO ASCENDING AORTA INDEX: 1.79 CM/M2
ECHO AO ROOT DIAM: 3.2 CM
ECHO AO ROOT INDEX: 1.59 CM/M2
ECHO BSA: 2.07 M2
ECHO LA AREA 2C: 20 CM2
ECHO LA AREA 4C: 17.5 CM2
ECHO LA DIAMETER INDEX: 1.84 CM/M2
ECHO LA DIAMETER: 3.7 CM
ECHO LA MAJOR AXIS: 5.5 CM
ECHO LA MINOR AXIS: 5.2 CM
ECHO LA TO AORTIC ROOT RATIO: 1.16
ECHO LA VOL BP: 54 ML (ref 18–58)
ECHO LA VOL MOD A2C: 62 ML (ref 18–58)
ECHO LA VOL MOD A4C: 45 ML (ref 18–58)
ECHO LA VOL/BSA BIPLANE: 27 ML/M2 (ref 16–34)
ECHO LA VOLUME INDEX MOD A2C: 31 ML/M2 (ref 16–34)
ECHO LA VOLUME INDEX MOD A4C: 22 ML/M2 (ref 16–34)
ECHO LV EDV A2C: 69 ML
ECHO LV EDV A4C: 62 ML
ECHO LV EDV INDEX A4C: 31 ML/M2
ECHO LV EDV NDEX A2C: 34 ML/M2
ECHO LV EJECTION FRACTION A2C: 57 %
ECHO LV EJECTION FRACTION A4C: 60 %
ECHO LV EJECTION FRACTION BIPLANE: 58 % (ref 55–100)
ECHO LV ESV A2C: 30 ML
ECHO LV ESV A4C: 25 ML
ECHO LV ESV INDEX A2C: 15 ML/M2
ECHO LV ESV INDEX A4C: 12 ML/M2
ECHO LV FRACTIONAL SHORTENING: 32 % (ref 28–44)
ECHO LV INTERNAL DIMENSION DIASTOLE INDEX: 1.69 CM/M2
ECHO LV INTERNAL DIMENSION DIASTOLIC: 3.4 CM (ref 4.2–5.9)
ECHO LV INTERNAL DIMENSION SYSTOLIC INDEX: 1.14 CM/M2
ECHO LV INTERNAL DIMENSION SYSTOLIC: 2.3 CM
ECHO LV IVSD: 1.3 CM (ref 0.6–1)
ECHO LV MASS 2D: 138.8 G (ref 88–224)
ECHO LV MASS INDEX 2D: 69 G/M2 (ref 49–115)
ECHO LV POSTERIOR WALL DIASTOLIC: 1.2 CM (ref 0.6–1)
ECHO LV RELATIVE WALL THICKNESS RATIO: 0.71
ECHO LVOT AREA: 3.8 CM2
ECHO LVOT DIAM: 2.2 CM
ECHO RA AREA 4C: 20.7 CM2
ECHO RA END SYSTOLIC VOLUME APICAL 4 CHAMBER INDEX BSA: 32 ML/M2
ECHO RA VOLUME: 65 ML
ECHO RV BASAL DIMENSION: 4.1 CM
ECHO RV LONGITUDINAL DIMENSION: 7.8 CM
ECHO RV MID DIMENSION: 2.6 CM

## 2025-02-10 PROCEDURE — 97530 THERAPEUTIC ACTIVITIES: CPT

## 2025-02-10 PROCEDURE — APPSS30 APP SPLIT SHARED TIME 16-30 MINUTES

## 2025-02-10 PROCEDURE — 1200000000 HC SEMI PRIVATE

## 2025-02-10 PROCEDURE — 93325 DOPPLER ECHO COLOR FLOW MAPG: CPT | Performed by: INTERNAL MEDICINE

## 2025-02-10 PROCEDURE — 99233 SBSQ HOSP IP/OBS HIGH 50: CPT | Performed by: PSYCHIATRY & NEUROLOGY

## 2025-02-10 PROCEDURE — 93308 TTE F-UP OR LMTD: CPT | Performed by: INTERNAL MEDICINE

## 2025-02-10 PROCEDURE — 6370000000 HC RX 637 (ALT 250 FOR IP): Performed by: STUDENT IN AN ORGANIZED HEALTH CARE EDUCATION/TRAINING PROGRAM

## 2025-02-10 PROCEDURE — 93308 TTE F-UP OR LMTD: CPT

## 2025-02-10 PROCEDURE — 92507 TX SP LANG VOICE COMM INDIV: CPT

## 2025-02-10 PROCEDURE — APPNB30 APP NON BILLABLE TIME 0-30 MINS

## 2025-02-10 PROCEDURE — 92526 ORAL FUNCTION THERAPY: CPT

## 2025-02-10 PROCEDURE — 6360000002 HC RX W HCPCS: Performed by: INTERNAL MEDICINE

## 2025-02-10 PROCEDURE — 2500000003 HC RX 250 WO HCPCS: Performed by: INTERNAL MEDICINE

## 2025-02-10 PROCEDURE — 97116 GAIT TRAINING THERAPY: CPT

## 2025-02-10 PROCEDURE — 97112 NEUROMUSCULAR REEDUCATION: CPT

## 2025-02-10 PROCEDURE — 6370000000 HC RX 637 (ALT 250 FOR IP): Performed by: NURSE PRACTITIONER

## 2025-02-10 PROCEDURE — 97110 THERAPEUTIC EXERCISES: CPT

## 2025-02-10 PROCEDURE — 2500000003 HC RX 250 WO HCPCS: Performed by: STUDENT IN AN ORGANIZED HEALTH CARE EDUCATION/TRAINING PROGRAM

## 2025-02-10 RX ORDER — NIFEDIPINE 30 MG
30 TABLET, EXTENDED RELEASE ORAL DAILY
Status: DISCONTINUED | OUTPATIENT
Start: 2025-02-11 | End: 2025-02-14 | Stop reason: HOSPADM

## 2025-02-10 RX ORDER — NIFEDIPINE 30 MG
30 TABLET, EXTENDED RELEASE ORAL DAILY
Qty: 30 TABLET | Refills: 3 | Status: SHIPPED | OUTPATIENT
Start: 2025-02-11

## 2025-02-10 RX ADMIN — Medication 10 ML: at 09:00

## 2025-02-10 RX ADMIN — ASPIRIN 81 MG: 81 TABLET, CHEWABLE ORAL at 13:46

## 2025-02-10 RX ADMIN — TRAMADOL HYDROCHLORIDE 50 MG: 50 TABLET, COATED ORAL at 13:44

## 2025-02-10 RX ADMIN — ACETAMINOPHEN 1000 MG: 500 TABLET ORAL at 20:59

## 2025-02-10 RX ADMIN — ACETAMINOPHEN 1000 MG: 500 TABLET ORAL at 16:46

## 2025-02-10 RX ADMIN — LOSARTAN POTASSIUM 50 MG: 25 TABLET, FILM COATED ORAL at 16:50

## 2025-02-10 RX ADMIN — ENOXAPARIN SODIUM 30 MG: 100 INJECTION SUBCUTANEOUS at 11:00

## 2025-02-10 RX ADMIN — DICLOFENAC SODIUM 2 G: 10 GEL TOPICAL at 13:49

## 2025-02-10 RX ADMIN — ACETAMINOPHEN 1000 MG: 500 TABLET ORAL at 13:45

## 2025-02-10 RX ADMIN — ENOXAPARIN SODIUM 30 MG: 100 INJECTION SUBCUTANEOUS at 21:00

## 2025-02-10 RX ADMIN — SODIUM CHLORIDE, PRESERVATIVE FREE 10 ML: 5 INJECTION INTRAVENOUS at 13:50

## 2025-02-10 RX ADMIN — DICLOFENAC SODIUM 2 G: 10 GEL TOPICAL at 21:02

## 2025-02-10 RX ADMIN — ATORVASTATIN CALCIUM 80 MG: 80 TABLET, FILM COATED ORAL at 20:59

## 2025-02-10 RX ADMIN — SODIUM CHLORIDE, PRESERVATIVE FREE 10 ML: 5 INJECTION INTRAVENOUS at 21:00

## 2025-02-10 ASSESSMENT — PAIN SCALES - GENERAL
PAINLEVEL_OUTOF10: 7
PAINLEVEL_OUTOF10: 8

## 2025-02-10 NOTE — CONSULTS
Irvin Arreola  2/10/2025  [id number]    Chief Complaint: Frequent falls    Subjective   HPI: Irvin Arreola is a 74 y.o. male with PMHx notable for HTN, HLD, dementia, obesity who presented on 2/5/2025 with recurrent falls. Imaging revealed no acute traumatic injuries, did show bilateral femoral head AVN. Orthopedic Surgery consulted for bilateral femoral head AVN, do not feel is the primary cause of patient's recent falls, recommended conservative management and pain control. MRI Brain/MRA Head&Neck on 2/7 showed acute lacunar infarct involving the right paramedian lacie, as well as severe chronic microvascular ischemic changes, without significant stenosis of the major vessels of the head or neck.  Neurology consulted, recommending additional stroke workup with A1c, lipid panel, echo; as well as ASA + statin.     Patient reports that he is doing well.  He reports some mild left upper extremity weakness.  He denies any numbness or paresthesia.  He denies any speech changes.  He does note some mild difficulty with swallowing.  He is also having some mild pain in his left hip.      Past Medical History:   Diagnosis Date    JELENA (acute kidney injury) (HCC)     Frequent falls     High cholesterol     Hypertension     Kidney stone     Leg swelling     Suspected CHF (congestive heart failure)        Past Surgical History:   Procedure Laterality Date    APPENDECTOMY      TONSILLECTOMY         Social History     Tobacco Use    Smoking status: Never    Smokeless tobacco: Never   Vaping Use    Vaping status: Never Used   Substance Use Topics    Alcohol use: No    Drug use: No     Prior Level of Function:   Independent for mobility, self-care.  Requires assistance with IADLs.  Lives alone in 1 level apartment at [facility], has 24/7 supervision available.          Objective   Objective:  Patient Vitals for the past 24 hrs:   BP Temp Temp src Pulse Resp SpO2 Weight   02/10/25 0753 (!) 172/99 97.7 °F (36.5 °C) Oral

## 2025-02-10 NOTE — PROGRESS NOTES
[facility] - Inpatient Rehabilitation Department   Phone: [phone]    Occupational Therapy    [] Initial Evaluation            [x] Daily Treatment Note         [] Discharge Summary      Patient: Irvin Arreola   : 1950   MRN: [id number]   Date of Service:  2/10/2025    Admitting Diagnosis:  Frequent falls  Current Admission Summary: 74 y.o. male who presented to [facility] ER with complaints of leg pain and multiple falls recently. Poor historian. He does not appear to be very active. Describes pain in the left lateral hip of moderate intensity and of aching nature since 2-3 days ago which is relieved by rest. Denies new numbness/tingling. 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 Patient lives in AL facility and uses no assistive devices by his report to ambulate.    MRI (+) Acute lacunar infarct involving the right paramedian lacie.   Past Medical History:  has a past medical history of JELENA (acute kidney injury) (HCC), Frequent falls, High cholesterol, Hypertension, Kidney stone, Leg swelling, and Suspected CHF (congestive heart failure).  Past Surgical History:  has a past surgical history that includes Appendectomy and Tonsillectomy.    Discharge Recommendations: Irvin Arreola scored a 15/24 on the AM-PAC ADL Inpatient form. Current research shows that an AM-PAC score of 17 or less is typically not associated with a discharge to the patient's home setting. Based on the patient's AM-PAC score and their current ADL deficits, it is recommended that the patient have 5-7 sessions per week of Occupational Therapy at d/c to increase the patient's independence.  At this time, this patient demonstrates complex nursing, medical, and rehabilitative needs, and would benefit from intensive rehabilitation services upon discharge from the Inpatient setting.  This patient demonstrates the

## 2025-02-10 NOTE — PROGRESS NOTES
Date of Admission: 2/5/2025. Hospital Day: 6       HOSPITAL COURSE  74 M   w dementia admitted for acute left weakness, found to have  acute pontine infarct.     Interval  History:   Report same   lt leg weakness as yesterday and l hip pain .otherwise no complaints      Physical Exam     /85   Pulse 76   Temp 97.9 °F (36.6 °C) (Oral)   Resp 14   Ht 1.676 m (5' 6\")   Wt 91.9 kg (202 lb 11.2 oz)   SpO2 94%   BMI 32.72 kg/m²     General appearance: No apparent distress, appears stated age and cooperative.  Respiratory:  Normal respiratory effort. Clear to auscultation, bilaterally without Rales/Wheezes/Rhonchi.  Cardiovascular: Regular rate and rhythm with normal S1/S2 without murmurs, rubs or gallops.  Neuro  : lt leg  power 4/5,  RL,  URSSEL and RU 5/5        Assessment/Plan:    #Acute  rt pontine stroke with left sided weakness  CTH  with no acute finding  MRI brain 2/7  show acute  rtpontine infarct  MRA head and neck without critical stenosis or occulsion except intracranial stenosis  Echo limited ordered  Tele ,ASA, statin  Lipid panel ,Hemoglobin A1c 4.3  Pt ot  speech  Neuroconsult, aru consult    HTN  Remain elevated.  Was 170/109 this morning  Goal BP  < 160/90. Elevated today. Out of permissive htn range. Start losartan 50, add nifedipine 30 mg.  Ordered blood pressure 140 to 160 for next few weeks      #Left hip pain  Has bl arthritis with AVN  . No intervention needed for now as per ortho    #leukocytosis  Improving.  Suspect reactive .No infection apparent. Blood cx, -ve, procal wnl                Active Hospital Problems    Diagnosis     Acute CVA (cerebrovascular accident) (HCC) [I63.9]     Frequent falls [R29.6]     Primary hypertension [I10]     Tachycardia [R00.0]     Leg swelling [M79.89]            DVT Prophylaxis:    Diet: ADULT DIET; Dysphagia - Soft and Bite Sized  Code Status: DNR-CCA      Dispo - aru .  Expected DC  in/on   .  Barrier to discharge           Chief Complaint:

## 2025-02-10 NOTE — PROGRESS NOTES
Date of Admission: 2/5/2025. Hospital Day: 6       HOSPITAL COURSE  74 M   w dementia admitted for acute left weakness, found to have  acute pontine infarct.  Echocardiogram pending, plan to discharge to ARU, pre-CERT pending, stable for dc    Interval  History:   Report same   lt leg weakness as yesterday and l hip pain .otherwise no complaints      Physical Exam     /85   Pulse 76   Temp 97.9 °F (36.6 °C) (Oral)   Resp 14   Ht 1.676 m (5' 6\")   Wt 91.9 kg (202 lb 11.2 oz)   SpO2 94%   BMI 32.72 kg/m²     General appearance: No apparent distress, appears stated age and cooperative.  Respiratory:  Normal respiratory effort. Clear to auscultation, bilaterally without Rales/Wheezes/Rhonchi.  Cardiovascular: Regular rate and rhythm with normal S1/S2 without murmurs, rubs or gallops.  Neuro  : lt leg  power 4/5,  RL,  RUSSEL and RU 5/5        Assessment/Plan:    #Acute  rt pontine stroke with left sided weakness  CTH  with no acute finding  MRI brain 2/7  show acute  rtpontine infarct  MRA head and neck without critical stenosis or occulsion except intracranial stenosis  Echo limited ordered  Tele ,ASA, statin  Lipid panel ,Hemoglobin A1c 4.3  Pt ot  speech  Neuroconsult, aru consult, patient pending    HTN  Remain elevated.  Was 170/109 this morning  Goal BP  < 160/90. Elevated today. Out of permissive htn range. Start losartan 50, add nifedipine 30 mg.  Ordered blood pressure 140 to 160 for next few weeks      #Left hip pain  Has bl arthritis with AVN  . No intervention needed for now as per ortho    #leukocytosis  Improving.  Suspect reactive .No infection apparent. Blood cx, -ve, procal wnl                Active Hospital Problems    Diagnosis     Acute CVA (cerebrovascular accident) (HCC) [I63.9]     Frequent falls [R29.6]     Primary hypertension [I10]     Tachycardia [R00.0]     Leg swelling [M79.89]            DVT Prophylaxis:    Diet: ADULT DIET; Dysphagia - Soft and Bite Sized  Code Status:

## 2025-02-10 NOTE — PLAN OF CARE
Problem: Discharge Planning  Goal: Discharge to home or other facility with appropriate resources  Outcome: Progressing     Problem: Pain  Goal: Verbalizes/displays adequate comfort level or baseline comfort level  Outcome: Progressing     Problem: Safety - Adult  Goal: Free from fall injury  Outcome: Progressing     Problem: Respiratory - Adult  Goal: Achieves optimal ventilation and oxygenation  Outcome: Progressing     Problem: Cardiovascular - Adult  Goal: Maintains optimal cardiac output and hemodynamic stability  Outcome: Progressing     Problem: Musculoskeletal - Adult  Goal: Return ADL status to a safe level of function  Outcome: Progressing

## 2025-02-10 NOTE — PROGRESS NOTES
[facility] - Inpatient Rehabilitation Department   Phone: [phone]    Physical Therapy    [] Initial Evaluation            [x] Daily Treatment Note         [] Discharge Summary      Patient: Irvin Arreola   : 1950   MRN: [id number]   Date of Service:  2/10/2025  Admitting Diagnosis: Frequent falls  Current Admission Summary: Irvin Arreola is a 74 y.o. male who presented to [facility] ER with complaints of leg pain and multiple falls recently. Poor historian. He does not appear to be very active. Describes pain in the left lateral hip of moderate intensity and of aching nature since 2-3 days ago which is relieved by rest. Denies new numbness/tingling. 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 Patient lives in AL facility and uses no assistive devices by his report to ambulate.  MRI: Acute lacunar infarct involving the right paramedian lacie.   Past Medical History:  has a past medical history of JELENA (acute kidney injury) (HCC), Frequent falls, High cholesterol, Hypertension, Kidney stone, Leg swelling, and Suspected CHF (congestive heart failure).  Past Surgical History:  has a past surgical history that includes Appendectomy and Tonsillectomy.  Discharge Recommendations: Irvin Arreola scored a 17/24 on the AM-PAC short mobility form. Current research shows that an AM-PAC score of 17 or less is typically not associated with a discharge to the patient's home setting. Based on the patient's AM-PAC score and their current functional mobility deficits, it is recommended that the patient have 5-7 sessions per week of Physical Therapy at d/c to increase the patient's independence.  At this time, this patient demonstrates complex nursing, medical, and rehabilitative needs, and would benefit from intensive rehabilitation services upon discharge from the Inpatient setting.  This patient  falls within the last 6 months     Available Assistance at Discharge: 24 hr supervision (non-physical) available    Examination   Vision:   Vision Gross Assessment: Impaired and Vision Corrective Device: wears glasses for reading  Visual tracking: Impaired  Convergence: Impaired; Able to converge with opposite visual field obstructed   Peripheral vision: WFL  Hearing:   hard of hearing, left hearing aid, right hearing aid (Reports he does not wear them)   Observation:   General Observation:  Patient in semi-lozada's position. On RA, telemetry, external male catheter in place       Subjective  General: Patient semi reclined in bed upon arrival and agreeable to working with PT.   Pain: 0/10 -denies pain at rest  Pain Interventions: repositioned  and therapy activities modified     Functional Mobility  Bed Mobility:  Supine to Sit: stand by assistance  Scooting: stand by assistance  Comments: HOB elevated, heavy use of bed rail, struggling to get to sit position.   Transfers:  Sit to stand transfer: contact guard assistance  Stand to sit transfer: contact guard assistance  Comments: Pt transferred from EOB up to RW. VCs for hand placement .   Ambulation:  Surface:level surface  Assistive Device: rolling walker  Assistance: contact guard assistance  Distance: 40 ft  Gait Mechanics: slow kemar, Decreased gait speed, decreased (B) foot clearance, decreased step height   Comments:  Pt very SOB with minimal activity, needing 30 sec of seated rest to recover after activity.     Stair Mobility:  Stair mobility not completed on this date.  Comments:  Wheelchair Mobility:  No w/c mobility completed on this date.  Comments:  Balance:  Static Sitting Balance: fair (+): maintains balance at SBA/supervision without use of UE support  Dynamic Sitting Balance: fair: maintains balance at CGA without use of UE support  Static Standing Balance: fair (-): maintains balance at CGA with use of UE support  Dynamic Standing Balance: fair

## 2025-02-10 NOTE — PROGRESS NOTES
Irvin WOLFE Arreola  Neurology Follow-up  [facility] Neurology    Date of Service: 2/10/2025    Subjective:   CC: Follow up today regarding: Acute recurrent falling and new stroke    Events noted. Chart and lab reviewed.  Patient seen this morning he is sitting up in chair eating breakfast.  Echocardiogram is pending.  He offers no new complaints today.  He denies headache, dizziness, dysphagia.  Other review of systems unremarkable.      ROS : A 10-12 system review obtained and updated today and is unremarkable except as mentioned  in my interval history.     Past medical history, social history, medication and family history reviewed.       Objective:  Exam:   Constitutional:   Vitals:    02/09/25 2316 02/10/25 0455 02/10/25 0512 02/10/25 0753   BP: (!) 142/81 (!) 168/99  (!) 172/99   Pulse: 79 72  71   Resp: 14 16  16   Temp: 97.8 °F (36.6 °C) 97.7 °F (36.5 °C)  97.7 °F (36.5 °C)   TempSrc: Oral Oral  Oral   SpO2: 95% 95%  92%   Weight:   91.9 kg (202 lb 11.2 oz)    Height:         General appearance:  Normal development and appear in no acute distress.   Mental Status:   Oriented to person, place only  Memory: Poor immediate recall.  Intact remote memory  Normal attention span and concentration.  Language: Mild dysarthric speech.  Poor fund of Knowledge.   Cranial Nerves:   II:   Pupils: equal, round, reactive to light  III,IV,VI: Extra Ocular Movements are intact. No nystagmus  V: Facial sensation is intact  VII: Facial strength and movements: intact and symmetric  XII: Tongue movements are normal  Musculoskeletal: 5/5 in all 4 extremities.   Tone: Normal tone.   Reflexes: Symmetric 2+ in both arms and legs.  Coordination: no pronator drift, no dysmetria with FNF  Sensation: normal.  Gait/Posture: Unsteady    MDM:      A. Problems (any 1)    High:    [x] Acute/Chronic Illness/injury posing threat to life or bodily function:    [] Severe exacerbation of chronic illness:      Moderate:    []     1 or more  chronic illness with exacerbation, progression or side effect of treatment or  []     2 or more stable chronic illnesses or  []     1 acute illness with systemic symptoms     ---------------------------------------------------------------------  B. Risk of Treatment (any 1)   [x] Drugs/treatments that require intensive monitoring for toxicity include:    [] Change in code status:    [] Decision to escalate care:    [] Major surgery/procedure with associated risk factors:    [x] Prescription drug management  ----------------------------------------------------------------------  [x] High (any 2)  [] Moderate (any 1)    C. Data (any 2 for high and any 1 for moderate)  [x] Discussed management of the case with: Nurse  [x] Imaging personally reviewed and interpreted, includes:    [x] Data Review (any 3)  [x] Collateral history obtained from:    [x] All available Consultant notes from yesterday/today were reviewed  [x] All current labs were reviewed and interpreted for clinical significance   [x] Appropriate follow-up labs were ordered      Data  LABS:   Lab Results   Component Value Date/Time     02/07/2025 04:26 AM    K 3.9 02/07/2025 04:26 AM     02/07/2025 04:26 AM    CO2 24 02/07/2025 04:26 AM    BUN 15 02/07/2025 04:26 AM    CREATININE 0.8 02/07/2025 04:26 AM    GFRAA >60 10/20/2018 05:52 PM    LABGLOM >90 02/07/2025 04:26 AM    LABGLOM >60 06/26/2023 10:54 AM    GLUCOSE 121 02/07/2025 04:26 AM    PHOS 2.7 06/26/2023 10:54 AM    MG 2.10 06/12/2023 05:04 AM    CALCIUM 8.9 02/07/2025 04:26 AM     Lab Results   Component Value Date/Time    WBC 10.4 02/08/2025 03:32 PM    RBC 5.09 02/08/2025 03:32 PM    HGB 15.9 02/08/2025 03:32 PM    HCT 46.7 02/08/2025 03:32 PM    MCV 91.7 02/08/2025 03:32 PM    RDW 13.3 02/08/2025 03:32 PM     02/08/2025 03:32 PM   No results found for: \"INR\", \"PROTIME\"        Neuroimaging was independently reviewed by me and discussed results with the patient  I reviewed blood

## 2025-02-10 NOTE — PROGRESS NOTES
Speech Language Pathology  [facility] - Inpatient Rehabilitation Services  [phone]  SLP Dysphagia and Speech Language Cognitive Treatment       Patient: Irvin Arreola   : 1950   MRN: [id number]      Evaluation Date: 2/10/2025      Admitting Dx: Falls [R29.6]  General weakness [R53.1]  SIRS (systemic inflammatory response syndrome) ([facility]) [R65.10]  Frequent falls [R29.6]  Acute CVA (cerebrovascular accident) ([facility]) [I63.9]  Treatment Diagnosis: Cognitive-Linguistic Deficits , Speech Language Deficits , Oropharyngeal Dysphagia   Pain: Denies                                  Recommendations      Recommended Diet and Intervention 2/10/2025:  Diet Solids Recommendation:  Dysphagia III Soft and bite sized  Liquid Consistency Recommendation:  Thin liquids  Recommended form of Meds: Meds in puree          Compensatory strategies: Alternate solids/liquids , Upright as possible with all PO intake , Small bites/sips , Eat/feed slowly, Remain upright 30-45 min , Aspiration Precautions     Discharge Recommendations:  Recommend ongoing SLP for speech and dysphagia therapy upon discharge from hospital     History/Course of Treatment     H&P: \"Irvin Arreola is 74 y.o. male with history of hypertension and bilateral leg swelling  He now presents to the ER with complaint of frequent falls for the past 3 days  He is unable to ambulate even short distances such as from his bed to the door  He has bilateral hip pain which is worse on the left side  On exam he is not able to bend the left hip or knee because of pain  He also thinks the left side is more weak than the right  He does not know the year or where he is and unable to give list of his medication  He denies syncope or lightheadedness\"    Imaging:  CT Chest:   IMPRESSION:  No evidence of pulmonary embolism or acute pulmonary abnormality.     Extensive coronary arterial calcifications.    MRI:  IMPRESSION:  1. Acute lacunar infarct involving the right  paramedian lacie.  2. Severe chronic microvascular ischemic changes.  3. No significant stenosis of the major arterial vessels of the head and neck.  The findings were sent to the Radiology Results Communication Center at 1:30  pm on 2/7/2025 to be communicated to a licensed caregiver.      History/Prior Level of Function:   Living Status: Lives in an assisted living facility  Prior Dysphagia/speech History: No prior SLP notes located in chart review.    Reason for referral: SLP evaluation orders received due to CVA protocol       Evaluation/Treatment     Subjective:  Pt sitting upright in chair at bedside, eating breakfast upon SLP entry. Pt receptive to intervention.     Dysphagia Treatment:     Assessment of Texture Tolerance:  Diet level prior to treatment: Regular texture diet , Thin liquids   Tolerance of Current Diet Level: Per chart, no noted difficulty with current diet level      -Impressions: Pt was positioned Upright in chair, awake and alert. Currently on room air. Trials of thin liquids, soft solids , and regular solids  were provided to assess swallow function. Oral phase characterized by complete labial seal, mildly prolonged and disorganized oral mastication, and complete oral transit. Pt with coughing episode x 1 with regular solids, episodic immediate and delayed throat clearing also noted with thin liquids. When cued to take small, single sips of thin liquids, no overt clinical s/s aspiration across PO intake. Re-educated pt on safe swallowing strategies as above. Pt demonstrates increased risk for aspiration due to cognitive state , co morbidities , and new CVA . Based on today's assessment recommend Dysphagia III Soft and bite sized  with Thin liquids , Meds in puree , and ongoing dysphagia intervention for diet tolerance.      Eating Assistance:   Setup or clean-up assistance    Speech Language/Cognitive Treatment:  Impressions: This date goals were targeted via functional speech and

## 2025-02-11 LAB
GLUCOSE BLD-MCNC: 137 MG/DL (ref 70–99)
PERFORMED ON: ABNORMAL

## 2025-02-11 PROCEDURE — 6370000000 HC RX 637 (ALT 250 FOR IP): Performed by: NURSE PRACTITIONER

## 2025-02-11 PROCEDURE — APPNB30 APP NON BILLABLE TIME 0-30 MINS

## 2025-02-11 PROCEDURE — 92507 TX SP LANG VOICE COMM INDIV: CPT

## 2025-02-11 PROCEDURE — 97535 SELF CARE MNGMENT TRAINING: CPT

## 2025-02-11 PROCEDURE — 2500000003 HC RX 250 WO HCPCS: Performed by: INTERNAL MEDICINE

## 2025-02-11 PROCEDURE — 97110 THERAPEUTIC EXERCISES: CPT

## 2025-02-11 PROCEDURE — 6360000002 HC RX W HCPCS: Performed by: INTERNAL MEDICINE

## 2025-02-11 PROCEDURE — APPSS30 APP SPLIT SHARED TIME 16-30 MINUTES

## 2025-02-11 PROCEDURE — 97530 THERAPEUTIC ACTIVITIES: CPT

## 2025-02-11 PROCEDURE — 92526 ORAL FUNCTION THERAPY: CPT

## 2025-02-11 PROCEDURE — 99232 SBSQ HOSP IP/OBS MODERATE 35: CPT | Performed by: PSYCHIATRY & NEUROLOGY

## 2025-02-11 PROCEDURE — 1200000000 HC SEMI PRIVATE

## 2025-02-11 PROCEDURE — 6370000000 HC RX 637 (ALT 250 FOR IP): Performed by: STUDENT IN AN ORGANIZED HEALTH CARE EDUCATION/TRAINING PROGRAM

## 2025-02-11 PROCEDURE — 97116 GAIT TRAINING THERAPY: CPT

## 2025-02-11 PROCEDURE — 6360000002 HC RX W HCPCS: Performed by: NURSE PRACTITIONER

## 2025-02-11 PROCEDURE — 2500000003 HC RX 250 WO HCPCS: Performed by: STUDENT IN AN ORGANIZED HEALTH CARE EDUCATION/TRAINING PROGRAM

## 2025-02-11 RX ORDER — HYDRALAZINE HYDROCHLORIDE 20 MG/ML
10 INJECTION INTRAMUSCULAR; INTRAVENOUS
Status: COMPLETED | OUTPATIENT
Start: 2025-02-11 | End: 2025-02-11

## 2025-02-11 RX ADMIN — ACETAMINOPHEN 1000 MG: 500 TABLET ORAL at 08:40

## 2025-02-11 RX ADMIN — ACETAMINOPHEN 1000 MG: 500 TABLET ORAL at 21:09

## 2025-02-11 RX ADMIN — ENOXAPARIN SODIUM 30 MG: 100 INJECTION SUBCUTANEOUS at 21:09

## 2025-02-11 RX ADMIN — LOSARTAN POTASSIUM 50 MG: 25 TABLET, FILM COATED ORAL at 08:40

## 2025-02-11 RX ADMIN — SODIUM CHLORIDE, PRESERVATIVE FREE 10 ML: 5 INJECTION INTRAVENOUS at 08:41

## 2025-02-11 RX ADMIN — SODIUM CHLORIDE, PRESERVATIVE FREE 10 ML: 5 INJECTION INTRAVENOUS at 21:10

## 2025-02-11 RX ADMIN — DICLOFENAC SODIUM 2 G: 10 GEL TOPICAL at 08:41

## 2025-02-11 RX ADMIN — ACETAMINOPHEN 1000 MG: 500 TABLET ORAL at 18:14

## 2025-02-11 RX ADMIN — CALCIUM POLYCARBOPHIL 625 MG: 625 TABLET ORAL at 08:40

## 2025-02-11 RX ADMIN — ENOXAPARIN SODIUM 30 MG: 100 INJECTION SUBCUTANEOUS at 08:42

## 2025-02-11 RX ADMIN — TRAMADOL HYDROCHLORIDE 50 MG: 50 TABLET, COATED ORAL at 13:08

## 2025-02-11 RX ADMIN — HYDRALAZINE HYDROCHLORIDE 10 MG: 20 INJECTION INTRAMUSCULAR; INTRAVENOUS at 01:25

## 2025-02-11 RX ADMIN — ASPIRIN 81 MG: 81 TABLET, CHEWABLE ORAL at 08:40

## 2025-02-11 RX ADMIN — SODIUM CHLORIDE, PRESERVATIVE FREE 10 ML: 5 INJECTION INTRAVENOUS at 01:25

## 2025-02-11 RX ADMIN — Medication 10 ML: at 21:09

## 2025-02-11 RX ADMIN — ATORVASTATIN CALCIUM 80 MG: 80 TABLET, FILM COATED ORAL at 21:09

## 2025-02-11 RX ADMIN — DICLOFENAC SODIUM 2 G: 10 GEL TOPICAL at 21:09

## 2025-02-11 RX ADMIN — NIFEDIPINE 30 MG: 30 TABLET, EXTENDED RELEASE ORAL at 08:45

## 2025-02-11 ASSESSMENT — PAIN DESCRIPTION - LOCATION
LOCATION: HIP

## 2025-02-11 ASSESSMENT — PAIN DESCRIPTION - ORIENTATION
ORIENTATION: LEFT

## 2025-02-11 ASSESSMENT — PAIN SCALES - GENERAL
PAINLEVEL_OUTOF10: 8
PAINLEVEL_OUTOF10: 7

## 2025-02-11 NOTE — DISCHARGE SUMMARY
4. 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 Multiplanar reformatted images are provided for review. Automated exposure control, iterative reconstruction, and/or weight based adjustment of the mA/kV was utilized to reduce the radiation dose to as low as reasonably achievable. COMPARISON: None. HISTORY: ORDERING SYSTEM PROVIDED HISTORY: fall TECHNOLOGIST PROVIDED HISTORY: Reason for exam:->fall Reason for Exam: fall Relevant Medical/Surgical History: Fall (Pt came in from [facility] via [facility], pt reports 2 falls today, pt denies hitting head, pt reports all over left sided pain. Pt denies blood thinner use.) FINDINGS: BONES/ALIGNMENT: There is normal alignment of the spine. The vertebral body heights are maintained. No osseous destructive lesion is seen. DEGENERATIVE CHANGES: No gross spinal canal stenosis or bony neural foraminal narrowing of the thoracic spine. SOFT TISSUES: No paraspinal mass is seen.  Small hiatal hernia is present.     No evidence of an acute fracture or traumatic malalignment involving the thoracic spine     CT CERVICAL SPINE WO CONTRAST    Result Date: 2/5/2025  EXAMINATION: CT OF THE CERVICAL SPINE WITHOUT CONTRAST 2/5/2025 9:15 pm TECHNIQUE: CT of the cervical spine was performed without the administration of intravenous contrast. Multiplanar reformatted images are provided for review. Automated exposure control, iterative reconstruction, and/or weight based adjustment of the mA/kV was utilized to reduce the radiation dose to as low as reasonably achievable. COMPARISON: None. HISTORY: ORDERING SYSTEM PROVIDED HISTORY: fall TECHNOLOGIST PROVIDED HISTORY: Reason for exam:->fall Decision Support Exception -  HISTORY: inj TECHNOLOGIST PROVIDED HISTORY: Reason for exam:->inj Reason for Exam: inj FINDINGS: The joints are normal in alignment.  There are no fractures.  No lytic or blastic lesions are seen.  Joint space is maintained. No erosions or sclerosis.  Soft tissues unremarkable.  No radiopaque foreign bodies are seen.     No acute osseous abnormality.       PRIOR TO ADMISSION PROBLEM LIST:  Patient Active Problem List   Diagnosis    Chest pain    Leg swelling    Asymptomatic hypertensive urgency    Suspected CHF (congestive heart failure)    Localized edema    JELENA (acute kidney injury) ([facility])    Tachycardia    HTN (hypertension), benign    Frequent falls    Cerebrovascular accident (CVA) ([facility])           Labs and Imaging   CBC:   Recent Labs     02/08/25  1532   WBC 10.4   HGB 15.9        BMP:  No results for input(s): \"NA\", \"K\", \"CL\", \"CO2\", \"BUN\", \"CREATININE\", \"GLUCOSE\" in the last 72 hours.  Hepatic: No results for input(s): \"AST\", \"ALT\", \"BILITOT\", \"ALKPHOS\" in the last 72 hours.    Invalid input(s): \"ALB\"  Lipids:   Lab Results   Component Value Date/Time    CHOL 112 02/07/2025 04:26 AM    HDL 43 02/07/2025 04:26 AM    TRIG 63 02/07/2025 04:26 AM     Hemoglobin A1C:   Lab Results   Component Value Date/Time    LABA1C 4.9 02/07/2025 04:26 AM     TSH: No results found for: \"TSH\"  Troponin: No results found for: \"TROPONINT\"  Lactic Acid: No results for input(s): \"LACTA\" in the last 72 hours.  BNP: No results for input(s): \"PROBNP\" in the last 72 hours.  UA:  Lab Results   Component Value Date/Time    NITRU Negative 02/06/2025 02:03 AM    COLORU Yellow 02/06/2025 02:03 AM    PHUR 7.0 02/06/2025 02:03 AM    PHUR 5.5 06/05/2023 02:45 PM    WBCUA 1 06/01/2023 03:45 PM    RBCUA 0 06/01/2023 03:45 PM    MUCUS 1+ 10/20/2018 05:24 PM    BACTERIA None Seen 06/01/2023 03:45 PM    CLARITYU Clear 02/06/2025 02:03 AM    LEUKOCYTESUR Negative 02/06/2025 02:03 AM    UROBILINOGEN 0.2 02/06/2025 02:03 AM    BILIRUBINUR

## 2025-02-11 NOTE — PROGRESS NOTES
[facility] - Inpatient Rehabilitation Department   Phone: [phone]    Occupational Therapy    [] Initial Evaluation            [x] Daily Treatment Note         [] Discharge Summary      Patient: Irvin Arreola   : 1950   MRN: [id number]   Date of Service:  2025    Admitting Diagnosis:  Frequent falls  Current Admission Summary: 74 y.o. male who presented to [facility] ER with complaints of leg pain and multiple falls recently. Poor historian. He does not appear to be very active. Describes pain in the left lateral hip of moderate intensity and of aching nature since 2-3 days ago which is relieved by rest. Denies new numbness/tingling. 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 Patient lives in AL facility and uses no assistive devices by his report to ambulate.    MRI (+) Acute lacunar infarct involving the right paramedian lacie.   Past Medical History:  has a past medical history of JELENA (acute kidney injury) (HCC), Frequent falls, High cholesterol, Hypertension, Kidney stone, Leg swelling, and Suspected CHF (congestive heart failure).  Past Surgical History:  has a past surgical history that includes Appendectomy and Tonsillectomy.    Discharge Recommendations: Irvin Arreola scored a 15/24 on the AM-PAC ADL Inpatient form. Current research shows that an AM-PAC score of 17 or less is typically not associated with a discharge to the patient's home setting. Based on the patient's AM-PAC score and their current ADL deficits, it is recommended that the patient have 5-7 sessions per week of Occupational Therapy at d/c to increase the patient's independence.  At this time, this patient demonstrates complex nursing, medical, and rehabilitative needs, and would benefit from intensive rehabilitation services upon discharge from the Inpatient setting.  This patient demonstrates the

## 2025-02-11 NOTE — PROGRESS NOTES
[facility] - Inpatient Rehabilitation Department   Phone: [phone]    Physical Therapy    [] Initial Evaluation            [x] Daily Treatment Note         [] Discharge Summary      Patient: Irvin Arreola   : 1950   MRN: [id number]   Date of Service:  2025  Admitting Diagnosis: Frequent falls  Current Admission Summary: Irvin Arreola is a 74 y.o. male who presented to [facility] ER with complaints of leg pain and multiple falls recently. Poor historian. He does not appear to be very active. Describes pain in the left lateral hip of moderate intensity and of aching nature since 2-3 days ago which is relieved by rest. Denies new numbness/tingling. 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 Patient lives in AL facility and uses no assistive devices by his report to ambulate.  MRI: Acute lacunar infarct involving the right paramedian lacie.   Past Medical History:  has a past medical history of JELENA (acute kidney injury) (HCC), Frequent falls, High cholesterol, Hypertension, Kidney stone, Leg swelling, and Suspected CHF (congestive heart failure).  Past Surgical History:  has a past surgical history that includes Appendectomy and Tonsillectomy.  Discharge Recommendations: Irvin Arreola scored a 17/24 on the AM-PAC short mobility form. Current research shows that an AM-PAC score of 17 or less is typically not associated with a discharge to the patient's home setting. Based on the patient's AM-PAC score and their current functional mobility deficits, it is recommended that the patient have 5-7 sessions per week of Physical Therapy at d/c to increase the patient's independence.  At this time, this patient demonstrates complex nursing, medical, and rehabilitative needs, and would benefit from intensive rehabilitation services upon discharge from the Inpatient setting.  This patient  demonstrates the ability to participate in and benefit from an intensive therapy program with a coordinated interdisciplinary team approach to foster frequent, structured, and documented communication among disciplines, who will work together to establish, prioritize, and achieve treatment goals. Please see assessment section for further patient specific details. If patient discharges prior to next session this note will serve as a discharge summary.  Please see below for the latest assessment towards goals.    DME Required For Discharge: DME to be determined at next level of care, DME to be determined pending patient progress  Precautions/Restrictions: high fall risk  Weight Bearing Restrictions: weight bearing as tolerated   [x] Right Lower Extremity  [x] Left Lower Extremity     Required Braces/Orthotics: no braces required   [] Right  [] Left  Positional Restrictions:no positional restrictions    Pre-Admission Information (Patient is a questionable historian, [facility] contacted and confirmed information below)   Lives With: alone    Type of Home:  [facility]   Home Layout: one level  Home Access: level entry  Bathroom Layout: walk in shower  Bathroom Equipment: grab bars in shower, grab bars around toilet, shower chair, hand held shower head  Toilet Height: elevated height  Home Equipment: sock aide  Transfer Assistance: Independent without use of device  Ambulation Assistance:Independent without use of device  ADL Assistance: independent with all ADL's  IADL Assistance: requires assistance with meal prep, requires assistance with laundry, requires assistance with vacuuming, requires assistance with cleaning, requires assistance with driving/transportation  Active :        [] Yes  [x] No (Reports that his sister drives)   Hand Dominance: [] Left  [x] Right  Current Employment: retired.  Occupation: Nursing assistant   Recent Falls: Admitted for frequent falls. Patient reports 2

## 2025-02-11 NOTE — PROGRESS NOTES
Message to Senia Valdovinos-Marker as follows \"pt here with cva ( positive MRI) nifedipine was held today, will restrt in am at half his normal dose- pt  has a bp of 160/100 this evening. should we continue to hold and remain in permissive hypertension or give prn meds ( which i do not have an order for)\"  Awaiting response

## 2025-02-11 NOTE — PROGRESS NOTES
Irvin WOLFE Arreola  Neurology Follow-up  [facility]    Date of Service: 2/11/2025    Subjective:   CC: Follow up today regarding: Acute recurrent falling and new stroke    Events noted. Chart and lab reviewed.  Patient seen this morning, he is resting in chair, eyes are closed upon entering room.  He is able to be aroused.  Echocardiogram yesterday showed EF 55-60%.  Blood pressure ranging systolic 140-160.  Pre-CERT for ARU pending.  He offers no new complaints today, same dysarthric speech but he denies headache, dizziness or dysphagia.  Other review of systems unremarkable      ROS : A 10-12 system review obtained and updated today and is unremarkable except as mentioned  in my interval history.     Past medical history, social history, medication and family history reviewed.       Objective:  Exam:   Constitutional:   Vitals:    02/11/25 0125 02/11/25 0328 02/11/25 0545 02/11/25 0725   BP: (!) 160/100 (!) 149/88  (!) 149/96   Pulse:  91  99   Resp:    16   Temp:  97.5 °F (36.4 °C)  98 °F (36.7 °C)   TempSrc:  Oral  Oral   SpO2:  94%  93%   Weight:   92.3 kg (203 lb 7.8 oz)    Height:         No change in exam today.  General appearance:  Normal development and appear in no acute distress.   Mental Status:   Oriented to person, place only  Memory: Poor immediate recall.  Intact remote memory  Normal attention span and concentration.  Language: Same dysarthric speech.  Poor fund of Knowledge.   Cranial Nerves:   II:   Pupils: equal, round, reactive to light  III,IV,VI: Extra Ocular Movements are intact. No nystagmus  V: Facial sensation is intact  VII: Facial strength and movements: intact and symmetric  XII: Tongue movements are normal  Musculoskeletal: 5/5 in all 4 extremities.   Tone: Normal tone.   Reflexes: Symmetric 2+ in both arms and legs.  Coordination: no pronator drift, no dysmetria with FNF  Sensation: normal.  Gait/Posture: Unsteady    MDM:      A. Problems (any 1)    High:    [x]    No results found for: \"INR\", \"PROTIME\"        Neuroimaging was independently reviewed by me and discussed results with the patient  I reviewed blood testing and other test results and discussed results with the patient      Impression:    Acute right pontine ischemic stroke, could be thromboembolic versus cardioembolic.  Leukoencephalopathy  Recurrent falls  Hypertension, not controlled  Hyperlipidemia  Chronic cognitive impairment      Recommendation    Continue supportive care  Monitor and control blood pressure.  Goal inpatient systolic 140.  Neurochecks  Telemetry  GI and DVT prophylaxis  PT and OT  Speech  Aspiration precautions  Stroke education  Discharge planning to ARU when medically stable  Will follow from periphery, please call with questions      AYSE Wiseman - CNP      Attending Supervising Physician's Attestation Statement       The patient was seen 2/11/2025 in conjunction with the nurse practitioner with independent history, evaluation and examination. I agree with the note which has been adjusted to reflect my findings, with the addition of the following:     The patient is about the same  Baseline cognitive impairment and dementia  Mild dysarthria  Generalized diffuse weakness 4/5  Poor fund of knowledge but good attention  Unsteady    Continue current supportive care for recent pontine stroke  Aspirin and statin  ARU  Stroke prevention discussed  Sliding scale  PT OT  Speech  DVT and GI prophylaxis  Outpatient follow-up with me 1 month  No further recommendation        Electronically signed by Anabelle Sierra MD on 2/11/25 at 1:11 PM EST       This dictation was generated by voice recognition computer software. Although all attempts are made to edit the dictation for accuracy, there may be errors in the transcription that are not intended.

## 2025-02-11 NOTE — CARE COORDINATION
02/11/25 0848   IMM Letter   IMM Letter given to Patient/Family/Significant other/Guardian/POA/by: 2ND IMM Given   IMM Letter date given: 02/11/25   IMM Letter time given: 0815

## 2025-02-11 NOTE — PROGRESS NOTES
Speech Language Pathology  [facility] - Inpatient Rehabilitation Services  [phone]  SLP Dysphagia and Speech Language Cognitive Treatment       Patient: Irvin Arreola   : 1950   MRN: [id number]      Evaluation Date: 2025      Admitting Dx: Falls [R29.6]  General weakness [R53.1]  SIRS (systemic inflammatory response syndrome) ([facility]) [R65.10]  Frequent falls [R29.6]  Acute CVA (cerebrovascular accident) ([facility]) [I63.9]  Treatment Diagnosis: Cognitive-Linguistic Deficits , Speech Language Deficits , Oropharyngeal Dysphagia   Pain: Denies                                  Recommendations      Recommended Diet and Intervention 2025:  Diet Solids Recommendation:  Dysphagia III Soft and bite sized  Liquid Consistency Recommendation:  Thin liquids  Recommended form of Meds: Meds in puree          Compensatory strategies: Alternate solids/liquids , Upright as possible with all PO intake , Small bites/sips , Eat/feed slowly, Remain upright 30-45 min , Aspiration Precautions     Discharge Recommendations:  Recommend ongoing SLP for speech and dysphagia therapy upon discharge from hospital     History/Course of Treatment     H&P: \"Irvin Arreola is 74 y.o. male with history of hypertension and bilateral leg swelling  He now presents to the ER with complaint of frequent falls for the past 3 days  He is unable to ambulate even short distances such as from his bed to the door  He has bilateral hip pain which is worse on the left side  On exam he is not able to bend the left hip or knee because of pain  He also thinks the left side is more weak than the right  He does not know the year or where he is and unable to give list of his medication  He denies syncope or lightheadedness\"    Imaging:  CT Chest:   IMPRESSION:  No evidence of pulmonary embolism or acute pulmonary abnormality.     Extensive coronary arterial calcifications.    MRI:  IMPRESSION:  1. Acute lacunar infarct involving the right

## 2025-02-12 ENCOUNTER — APPOINTMENT (OUTPATIENT)
Dept: GENERAL RADIOLOGY | Age: 75
DRG: 065 | End: 2025-02-12
Payer: MEDICARE

## 2025-02-12 PROCEDURE — 97535 SELF CARE MNGMENT TRAINING: CPT

## 2025-02-12 PROCEDURE — 74230 X-RAY XM SWLNG FUNCJ C+: CPT

## 2025-02-12 PROCEDURE — 6360000002 HC RX W HCPCS: Performed by: INTERNAL MEDICINE

## 2025-02-12 PROCEDURE — 97130 THER IVNTJ EA ADDL 15 MIN: CPT

## 2025-02-12 PROCEDURE — 2500000003 HC RX 250 WO HCPCS: Performed by: INTERNAL MEDICINE

## 2025-02-12 PROCEDURE — 97530 THERAPEUTIC ACTIVITIES: CPT

## 2025-02-12 PROCEDURE — 92526 ORAL FUNCTION THERAPY: CPT

## 2025-02-12 PROCEDURE — 97112 NEUROMUSCULAR REEDUCATION: CPT

## 2025-02-12 PROCEDURE — 97116 GAIT TRAINING THERAPY: CPT

## 2025-02-12 PROCEDURE — 6370000000 HC RX 637 (ALT 250 FOR IP): Performed by: STUDENT IN AN ORGANIZED HEALTH CARE EDUCATION/TRAINING PROGRAM

## 2025-02-12 PROCEDURE — 6370000000 HC RX 637 (ALT 250 FOR IP): Performed by: NURSE PRACTITIONER

## 2025-02-12 PROCEDURE — 92611 MOTION FLUOROSCOPY/SWALLOW: CPT

## 2025-02-12 PROCEDURE — 1200000000 HC SEMI PRIVATE

## 2025-02-12 RX ORDER — BISACODYL 10 MG
10 SUPPOSITORY, RECTAL RECTAL DAILY PRN
Status: DISCONTINUED | OUTPATIENT
Start: 2025-02-12 | End: 2025-02-14 | Stop reason: HOSPADM

## 2025-02-12 RX ADMIN — ACETAMINOPHEN 1000 MG: 500 TABLET ORAL at 15:12

## 2025-02-12 RX ADMIN — CALCIUM POLYCARBOPHIL 625 MG: 625 TABLET ORAL at 08:15

## 2025-02-12 RX ADMIN — DICLOFENAC SODIUM 2 G: 10 GEL TOPICAL at 19:42

## 2025-02-12 RX ADMIN — DICLOFENAC SODIUM 2 G: 10 GEL TOPICAL at 12:19

## 2025-02-12 RX ADMIN — ATORVASTATIN CALCIUM 80 MG: 80 TABLET, FILM COATED ORAL at 19:42

## 2025-02-12 RX ADMIN — ASPIRIN 81 MG: 81 TABLET, CHEWABLE ORAL at 08:16

## 2025-02-12 RX ADMIN — POLYETHYLENE GLYCOL 3350 17 G: 17 POWDER, FOR SOLUTION ORAL at 08:16

## 2025-02-12 RX ADMIN — ENOXAPARIN SODIUM 30 MG: 100 INJECTION SUBCUTANEOUS at 19:41

## 2025-02-12 RX ADMIN — ACETAMINOPHEN 1000 MG: 500 TABLET ORAL at 19:42

## 2025-02-12 RX ADMIN — ENOXAPARIN SODIUM 30 MG: 100 INJECTION SUBCUTANEOUS at 08:16

## 2025-02-12 RX ADMIN — LOSARTAN POTASSIUM 50 MG: 25 TABLET, FILM COATED ORAL at 08:15

## 2025-02-12 RX ADMIN — Medication 10 ML: at 19:42

## 2025-02-12 RX ADMIN — Medication 10 ML: at 08:16

## 2025-02-12 RX ADMIN — NIFEDIPINE 30 MG: 30 TABLET, EXTENDED RELEASE ORAL at 12:17

## 2025-02-12 RX ADMIN — ACETAMINOPHEN 1000 MG: 500 TABLET ORAL at 08:15

## 2025-02-12 ASSESSMENT — PAIN DESCRIPTION - ORIENTATION
ORIENTATION: LEFT

## 2025-02-12 ASSESSMENT — PAIN SCALES - GENERAL
PAINLEVEL_OUTOF10: 3
PAINLEVEL_OUTOF10: 5
PAINLEVEL_OUTOF10: 2

## 2025-02-12 ASSESSMENT — PAIN DESCRIPTION - DESCRIPTORS
DESCRIPTORS: ACHING

## 2025-02-12 ASSESSMENT — PAIN DESCRIPTION - LOCATION
LOCATION: LEG;FOOT
LOCATION: FOOT;LEG
LOCATION: LEG

## 2025-02-12 NOTE — PLAN OF CARE
Problem: Chronic Conditions and Co-morbidities  Goal: Patient's chronic conditions and co-morbidity symptoms are monitored and maintained or improved  2/12/2025 1042 by Blaire Rod RN  Outcome: Progressing  Note: Pt is A&Ox4, with some mild dysarthria, aphasia not apparent today, no drift in extremities or weakness noted, he was able to take pills whole in puree well this morning however speech noted some coughing with food and ordered a modified barium swallow test for today.      Problem: Pain  Goal: Verbalizes/displays adequate comfort level or baseline comfort level  2/12/2025 1042 by Blaire Rod, RN  Outcome: Progressing  Note: Pt c/o pain in left lower leg and knee, lidocaine patch applied as well as scheduled tylenol given, pain decreased but still present.     Problem: Safety - Adult  Goal: Free from fall injury  2/12/2025 1042 by Blaire Rod, RN  Outcome: Progressing  Note: D:  Patient continues to be a fall risk d/t weakness/fall at home  A:  Bed alarm on, reminded to call before ambulation  R:  Patient calls before ambulation only at times, overnight per report he tried to get oob unassisted, Will continue to assess fall risk status and report changes.

## 2025-02-12 NOTE — PROGRESS NOTES
for review. Automated exposure control, iterative reconstruction, and/or weight based adjustment of the mA/kV was utilized to reduce the radiation dose to as low as reasonably achievable. COMPARISON: Chest x-ray dated June 1, 2023 HISTORY: ORDERING SYSTEM PROVIDED HISTORY: fall - tachy - ro PE TECHNOLOGIST PROVIDED HISTORY: Reason for exam:->fall - tachy - ro PE Additional Contrast?->1 Reason for Exam: fall - tachy - ro PE Relevant Medical/Surgical History: Fall (Pt came in from [facility] via [facility], pt reports 2 falls today, pt denies hitting head, pt reports all over left sided pain. Pt denies blood thinner use.) FINDINGS: Pulmonary Arteries: Pulmonary arteries are adequately opacified for evaluation.  No evidence of intraluminal filling defect to suggest pulmonary embolism.  Main pulmonary artery is normal in caliber. Mediastinum: Benign-appearing mediastinal lymph nodes are present.  Extensive coronary arterial calcifications are noted.  Left ventricular hypertrophy is present.  The heart and pericardium demonstrate no acute abnormality.  There is no acute abnormality of the thoracic aorta. Lungs/pleura: Minimal bibasilar dependent changes are present.  No focal area of consolidation, pleural effusion or pneumothorax is noted.  No suspicious lung nodules are present. Upper Abdomen: Images through the upper abdomen demonstrate a small hiatal hernia.  Diffuse hepatic steatosis is present.  The gallbladder is surgically absent. Soft Tissues/Bones: No acute bone or soft tissue abnormality.     No evidence of pulmonary embolism or acute pulmonary abnormality. Extensive coronary arterial calcifications.     CT LUMBAR SPINE BONY RECONSTRUCTION    Result Date: 2/5/2025  EXAMINATION: CT OF THE LUMBAR SPINE WITHOUT CONTRAST  2/5/2025 TECHNIQUE: CT of the lumbar spine was performed without the administration of intravenous contrast. Multiplanar reformatted images are provided for review.  living facility via [facility], pt reports 2 falls today, pt denies hitting head, pt reports all over left sided pain. Pt denies blood thinner use.) FINDINGS: BONES/ALIGNMENT: There is no acute fracture or traumatic malalignment. DEGENERATIVE CHANGES: Anterior posterior spurring with loss of disc space height at C5-6 and C6-7.  Facet arthropathy at multiple levels. SOFT TISSUES: No prevertebral soft tissue swelling.  Large amount of thickening in the external auditory canals bilaterally.     No acute abnormality of the cervical spine.     CT HEAD WO CONTRAST    Result Date: 2/5/2025  EXAMINATION: CT OF THE HEAD WITHOUT CONTRAST  2/5/2025 6:15 pm TECHNIQUE: CT of the head was performed without the administration of intravenous contrast. Automated exposure control, iterative reconstruction, and/or weight based adjustment of the mA/kV was utilized to reduce the radiation dose to as low as reasonably achievable. COMPARISON: None. HISTORY: ORDERING SYSTEM PROVIDED HISTORY: fall TECHNOLOGIST PROVIDED HISTORY: Has a \"code stroke\" or \"stroke alert\" been called?->No Reason for exam:->fall Decision Support Exception - unselect if not a suspected or confirmed emergency medical condition->Emergency Medical Condition (MA) Reason for Exam: fall Relevant Medical/Surgical History: Fall (Pt came in from [facility] living [facility] via [email or web address], pt reports 2 falls today, pt denies hitting head, pt reports all over left sided pain. Pt denies blood thinner use.) FINDINGS: BRAIN/VENTRICLES: There is no acute intracranial hemorrhage, mass effect or midline shift.  No abnormal extra-axial fluid collection.  The gray-white differentiation is maintained without evidence of an acute infarct.  There is no evidence of hydrocephalus. Generalized atrophy and scattered senescent white matter changes are present. ORBITS: The visualized portion of the orbits demonstrate no acute abnormality. SINUSES: Nodular mucosal thickening is present

## 2025-02-12 NOTE — PROGRESS NOTES
Speech Language Pathology  [facility] - Inpatient Rehabilitation Services  [phone]  SLP Dysphagia and Speech Language Cognitive Treatment       Patient: Irvin Arreola   : 1950   MRN: [id number]      Evaluation Date: 2025      Admitting Dx: Falls [R29.6]  General weakness [R53.1]  SIRS (systemic inflammatory response syndrome) ([facility]) [R65.10]  Frequent falls [R29.6]  Acute CVA (cerebrovascular accident) ([facility]) [I63.9]  Treatment Diagnosis: Cognitive-Linguistic Deficits , Speech Language Deficits , Oropharyngeal Dysphagia   Pain: Denies                                  Recommendations      Recommended Diet and Intervention 2025:  Diet Solids Recommendation:  Dysphagia III Soft and bite sized  Liquid Consistency Recommendation:  Thin liquids  Recommended form of Meds: Meds in puree    Recommend completion of Modified Barium Swallow study to further assess pharyngeal phase of swallow      Compensatory strategies: Alternate solids/liquids , Upright as possible with all PO intake , Small bites/sips , Eat/feed slowly, Remain upright 30-45 min , Aspiration Precautions     Discharge Recommendations:  Recommend ongoing SLP for speech and dysphagia therapy upon discharge from hospital     History/Course of Treatment     H&P: \"Irvin Arreola is 74 y.o. male with history of hypertension and bilateral leg swelling  He now presents to the ER with complaint of frequent falls for the past 3 days  He is unable to ambulate even short distances such as from his bed to the door  He has bilateral hip pain which is worse on the left side  On exam he is not able to bend the left hip or knee because of pain  He also thinks the left side is more weak than the right  He does not know the year or where he is and unable to give list of his medication  He denies syncope or lightheadedness\"    Imaging:  CT Chest:   IMPRESSION:  No evidence of pulmonary embolism or acute pulmonary abnormality.     Extensive coronary  arterial calcifications.    MRI:  IMPRESSION:  1. Acute lacunar infarct involving the right paramedian lacie.  2. Severe chronic microvascular ischemic changes.  3. No significant stenosis of the major arterial vessels of the head and neck.  The findings were sent to the Radiology Results Communication Center at 1:30  pm on 2/7/2025 to be communicated to a licensed caregiver.      History/Prior Level of Function:   Living Status: Lives in an assisted living facility  Prior Dysphagia/speech History: No prior SLP notes located in chart review.    Reason for referral: SLP evaluation orders received due to CVA protocol       Evaluation/Treatment     Subjective:  Pt alert and positioned upright in bed. Pt agreeable to treatment at thi time. Improved speech intelligibility noted    Dysphagia Treatment:     Assessment of Texture Tolerance:  Diet level prior to treatment: Dysphagia III Soft and bite sized , Thin liquids   Tolerance of Current Diet Level: RN reported pt appears to be tolerating current diet level      -Impressions: Pt was positioned Upright in bed , awake and alert. Currently on room air. Improved vocal quality was noted in conversation prior to po trials. Mild L facial/labial droop noted at rest. Improved ROM noted with oral exercises with no overt asymmetry noted. Trials of thin liquids and soft solids  were provided to assess swallow function.Improved bolus control noted with thin liquids via cup and straw. However, premature bolus loss to pharynx continues to be suspected. Prolonged mastication with delayed but effective oral clearing noted with soft solids. Clinical symptoms of mild delayed swallow initiation and mild reduced laryngeal excursion via palpation noted with all textures. Delayed cough noted with thin mixed consistencies and with thin liquid rinse in combination with solids. Delayed/reduced pharyngeal clearing is suspected.   Based on today's assessment recommend Dysphagia III Soft and bite

## 2025-02-12 NOTE — PLAN OF CARE
Problem: Chronic Conditions and Co-morbidities  Goal: Patient's chronic conditions and co-morbidity symptoms are monitored and maintained or improved  Outcome: Progressing  Flowsheets (Taken 2/11/2025 1930)  Care Plan - Patient's Chronic Conditions and Co-Morbidity Symptoms are Monitored and Maintained or Improved:   Monitor and assess patient's chronic conditions and comorbid symptoms for stability, deterioration, or improvement   Update acute care plan with appropriate goals if chronic or comorbid symptoms are exacerbated and prevent overall improvement and discharge   Collaborate with multidisciplinary team to address chronic and comorbid conditions and prevent exacerbation or deterioration     Problem: Discharge Planning  Goal: Discharge to home or other facility with appropriate resources  Outcome: Progressing  Flowsheets (Taken 2/11/2025 1930)  Discharge to home or other facility with appropriate resources:   Identify barriers to discharge with patient and caregiver   Arrange for needed discharge resources and transportation as appropriate   Identify discharge learning needs (meds, wound care, etc)   Arrange for interpreters to assist at discharge as needed   Refer to discharge planning if patient needs post-hospital services based on physician order or complex needs related to functional status, cognitive ability or social support system     Problem: Pain  Goal: Verbalizes/displays adequate comfort level or baseline comfort level  Outcome: Progressing     Problem: Safety - Adult  Goal: Free from fall injury  Outcome: Progressing     Problem: Respiratory - Adult  Goal: Achieves optimal ventilation and oxygenation  Outcome: Progressing  Flowsheets (Taken 2/11/2025 1930)  Achieves optimal ventilation and oxygenation:   Assess for changes in respiratory status   Assess for changes in mentation and behavior     Problem: Cardiovascular - Adult  Goal: Maintains optimal cardiac output and hemodynamic

## 2025-02-12 NOTE — PROCEDURES
Facility/Department: [address]  MODIFIED BARIUM SWALLOW EVALUATION    Patient: Irvin Arreola   : 1950   MRN: [id number]      Evaluation Date: 2025   Admitting Diagnosis: Falls [R29.6]  General weakness [R53.1]  SIRS (systemic inflammatory response syndrome) ([facility]) [R65.10]  Frequent falls [R29.6]  Acute CVA (cerebrovascular accident) ([facility]) [I63.9]  Treatment Diagnosis: Dysphagia   Pain: Did not state                           Ordering MD: Dr. Bud Diaz MD  Radiologist: Dr. Be Haddad MD  Date of Onset: 2025  Date of Evaluation: 2025  Type of Study: Modified Barium Swallowing Study (MBS)  Diet Prior to Study: Dysphagia III Soft and bite sized  Thin liquids    Reason for referral: The patient presents for instrumental swallow study to evaluate oropharyngeal swallow function, assess aspiration risk, and determine least restrictive diet/plan of care.   H&P: \"Irvin Arreola is 74 y.o. male with history of hypertension and bilateral leg swelling  He now presents to the ER with complaint of frequent falls for the past 3 days  He is unable to ambulate even short distances such as from his bed to the door  He has bilateral hip pain which is worse on the left side  On exam he is not able to bend the left hip or knee because of pain  He also thinks the left side is more weak than the right  He does not know the year or where he is and unable to give list of his medication  He denies syncope or lightheadedness\"    MRI:  IMPRESSION:  1. Acute lacunar infarct involving the right paramedian lacie.  2. Severe chronic microvascular ischemic changes.  3. No significant stenosis of the major arterial vessels of the head and neck.  The findings were sent to the Radiology Results Communication Center at 1:30  pm on 2025 to be communicated to a licensed caregiver.    Impression:  Modified Barium Swallow evaluation completed on 2025. Patient presents with oropharyngeal dysphagia,  characterized by reduced labial seal, prolonged anterior mastication, impaired A-P transit, reduced bolus control, delayed swallow initiation, reduced base of tongue retraction, and reduced hyolaryngeal excursion. These deficits contributed to trace-mild oral and BOT residue (liquids and puree) and premature posterior spillage with pooling to the pharynx (thin liquids, soft/mixed solids, regular solids). Pt with one episode of trace flash penetration during the swallow given thin liquids via straw; penetrated material was shallow and immediately redirected from airway. No additional airway invasion, no aspiration viewed across study. Overall, pt with increased risk of aspiration due to reduced oral control and premature spillage of boluses. Recommend Dysphagia III Soft and Bite-sized diet with thin liquids, use of strategies as below, and speech therapy for dysphagia intervention.       Aspiration/Penetration Risk:  Increased d/t reduced bolus control and premature posterior spillage    Recommendations:    Diet Level:   Dysphagia III Soft and bite sized  with Thin liquids   Medication: Meds in puree     Strategies: Upright as possible with all PO intake, Small bites/sips, Eat/feed slowly, Remain upright 30-45 min, Avoid mixed consistencies, Aspiration Precautions     Treatments: Ongoing dysphagia therapy with SLP   Goals:   Pt/family will demonstrate understanding of results Modified Barium Swallow Study, risk for aspiration, rationale for diet level and compensatory strategies      Consistencies given: thin liquids, mildly (nectar) thick liquids , moderately (honey) thick liquids , puree , soft and bite sized solids , mixed consistency , and regular solids     Oral Phase  Decreased labial seal   Decreased bolus control   Prolonged/impaired mastication   Premature bolus loss   Impaired A-P bolus transport     Pharyngeal Phase  Pharyngeal pooling prior to swallow initiation   Delayed swallow initiation   Decreased

## 2025-02-12 NOTE — PROGRESS NOTES
[facility] - Inpatient Rehabilitation Department   Phone: [phone]    Occupational Therapy    [] Initial Evaluation            [x] Daily Treatment Note         [] Discharge Summary      Patient: Irvin Arreola   : 1950   MRN: [id number]   Date of Service:  2025    Admitting Diagnosis:  Frequent falls  Current Admission Summary: 74 y.o. male who presented to [facility] ER with complaints of leg pain and multiple falls recently. Poor historian. He does not appear to be very active. Describes pain in the left lateral hip of moderate intensity and of aching nature since 2-3 days ago which is relieved by rest. Denies new numbness/tingling. 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 Patient lives in AL facility and uses no assistive devices by his report to ambulate.    MRI (+) Acute lacunar infarct involving the right paramedian lacie.   Past Medical History:  has a past medical history of JELENA (acute kidney injury) (HCC), Frequent falls, High cholesterol, Hypertension, Kidney stone, Leg swelling, and Suspected CHF (congestive heart failure).  Past Surgical History:  has a past surgical history that includes Appendectomy and Tonsillectomy.    Discharge Recommendations: Irvin Arreola scored a 16/24 on the AM-PAC ADL Inpatient form. Current research shows that an AM-PAC score of 17 or less is typically not associated with a discharge to the patient's home setting. Based on the patient's AM-PAC score and their current ADL deficits, it is recommended that the patient have 5-7 sessions per week of Occupational Therapy at d/c to increase the patient's independence.  At this time, this patient demonstrates complex nursing, medical, and rehabilitative needs, and would benefit from intensive rehabilitation services upon discharge from the Inpatient setting.  This patient demonstrates the

## 2025-02-12 NOTE — PROGRESS NOTES
[facility] - Inpatient Rehabilitation Department   Phone: [phone]    Physical Therapy    [] Initial Evaluation            [x] Daily Treatment Note         [] Discharge Summary      Patient: Irvin Arreola   : 1950   MRN: [id number]   Date of Service:  2025  Admitting Diagnosis: Frequent falls  Current Admission Summary: Irvin Arreola is a 74 y.o. male who presented to [facility] ER with complaints of leg pain and multiple falls recently. Poor historian. He does not appear to be very active. Describes pain in the left lateral hip of moderate intensity and of aching nature since 2-3 days ago which is relieved by rest. Denies new numbness/tingling. 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 Patient lives in AL facility and uses no assistive devices by his report to ambulate.  MRI: Acute lacunar infarct involving the right paramedian lacie.   Past Medical History:  has a past medical history of JELENA (acute kidney injury) (HCC), Frequent falls, High cholesterol, Hypertension, Kidney stone, Leg swelling, and Suspected CHF (congestive heart failure).  Past Surgical History:  has a past surgical history that includes Appendectomy and Tonsillectomy.  Discharge Recommendations: Irvin Arreola scored a 17/24 on the AM-PAC short mobility form. Current research shows that an AM-PAC score of 17 or less is typically not associated with a discharge to the patient's home setting. Based on the patient's AM-PAC score and their current functional mobility deficits, it is recommended that the patient have 5-7 sessions per week of Physical Therapy at d/c to increase the patient's independence.  At this time, this patient demonstrates complex nursing, medical, and rehabilitative needs, and would benefit from intensive rehabilitation services upon discharge from the Inpatient setting.  This patient  place, oriented to situation, and disoriented to time   Command Following:   accurately follows one step commands    Education  Barriers To Learning: cognition and hearing  Patient Education: patient educated on goals, PT role and benefits, plan of care, general safety, functional mobility training, proper use of assistive device/equipment, transfer training, discharge recommendations  Learning Assessment:  patient will require reinforcement due to cognitive deficits    Assessment  Activity Tolerance: Good; patient complains of dizziness during session.   Impairments Requiring Therapeutic Intervention: decreased functional mobility, decreased strength, decreased safety awareness, decreased cognition, decreased endurance, decreased sensation, decreased balance, decreased fine motor control, decreased coordination  Prognosis: good  Clinical Assessment: Patient is a 74 year old male admitted for frequent falls. Prior to admission, patient reports living at [facility] and was independent without the use of a device for functional mobility. MRI revealed acute infarct. Patient currently requires CGA for transfers and gait training. Patient continues to display (L) sided strength/coordination deficits but has improved from initial evaluation. Tx session limited due to patient leaving floor for testing.Patient would benefit from additional skilled PT upon discharge to promote independence and safety with functional mobility.   Safety Interventions: patient left in chair, chair alarm in place, call light within reach, gait belt, patient at risk for falls, and nurse notified    Plan  Frequency: 5-7 x/week   Current Treatment Recommendations: strengthening, balance training, functional mobility training, transfer training, gait training, endurance training, neuromuscular re-education, pain management, home exercise program, safety education, and equipment evaluation/education    Goals  Patient Goals: Patient did not state

## 2025-02-13 PROCEDURE — 97110 THERAPEUTIC EXERCISES: CPT

## 2025-02-13 PROCEDURE — 2500000003 HC RX 250 WO HCPCS: Performed by: STUDENT IN AN ORGANIZED HEALTH CARE EDUCATION/TRAINING PROGRAM

## 2025-02-13 PROCEDURE — 6370000000 HC RX 637 (ALT 250 FOR IP): Performed by: STUDENT IN AN ORGANIZED HEALTH CARE EDUCATION/TRAINING PROGRAM

## 2025-02-13 PROCEDURE — 1200000000 HC SEMI PRIVATE

## 2025-02-13 PROCEDURE — 92526 ORAL FUNCTION THERAPY: CPT

## 2025-02-13 PROCEDURE — 97116 GAIT TRAINING THERAPY: CPT

## 2025-02-13 PROCEDURE — 6360000002 HC RX W HCPCS: Performed by: INTERNAL MEDICINE

## 2025-02-13 PROCEDURE — 6370000000 HC RX 637 (ALT 250 FOR IP): Performed by: HOSPITALIST

## 2025-02-13 PROCEDURE — 97112 NEUROMUSCULAR REEDUCATION: CPT

## 2025-02-13 PROCEDURE — 6370000000 HC RX 637 (ALT 250 FOR IP): Performed by: NURSE PRACTITIONER

## 2025-02-13 PROCEDURE — 2500000003 HC RX 250 WO HCPCS: Performed by: INTERNAL MEDICINE

## 2025-02-13 PROCEDURE — 97129 THER IVNTJ 1ST 15 MIN: CPT

## 2025-02-13 RX ORDER — METOPROLOL TARTRATE 25 MG/1
25 TABLET, FILM COATED ORAL 2 TIMES DAILY
Qty: 60 TABLET | Refills: 3 | Status: SHIPPED | OUTPATIENT
Start: 2025-02-13

## 2025-02-13 RX ORDER — METOPROLOL TARTRATE 25 MG/1
25 TABLET, FILM COATED ORAL 2 TIMES DAILY
Status: DISCONTINUED | OUTPATIENT
Start: 2025-02-13 | End: 2025-02-14 | Stop reason: HOSPADM

## 2025-02-13 RX ADMIN — METOPROLOL TARTRATE 25 MG: 25 TABLET, FILM COATED ORAL at 21:02

## 2025-02-13 RX ADMIN — NIFEDIPINE 30 MG: 30 TABLET, EXTENDED RELEASE ORAL at 09:25

## 2025-02-13 RX ADMIN — LOSARTAN POTASSIUM 50 MG: 25 TABLET, FILM COATED ORAL at 09:15

## 2025-02-13 RX ADMIN — ACETAMINOPHEN 1000 MG: 500 TABLET ORAL at 21:02

## 2025-02-13 RX ADMIN — DICLOFENAC SODIUM 2 G: 10 GEL TOPICAL at 21:03

## 2025-02-13 RX ADMIN — ACETAMINOPHEN 1000 MG: 500 TABLET ORAL at 15:20

## 2025-02-13 RX ADMIN — DICLOFENAC SODIUM 2 G: 10 GEL TOPICAL at 09:15

## 2025-02-13 RX ADMIN — ASPIRIN 81 MG: 81 TABLET, CHEWABLE ORAL at 09:15

## 2025-02-13 RX ADMIN — CALCIUM POLYCARBOPHIL 625 MG: 625 TABLET ORAL at 09:15

## 2025-02-13 RX ADMIN — Medication 10 ML: at 09:15

## 2025-02-13 RX ADMIN — ENOXAPARIN SODIUM 30 MG: 100 INJECTION SUBCUTANEOUS at 09:15

## 2025-02-13 RX ADMIN — ACETAMINOPHEN 1000 MG: 500 TABLET ORAL at 09:15

## 2025-02-13 RX ADMIN — SODIUM CHLORIDE, PRESERVATIVE FREE 10 ML: 5 INJECTION INTRAVENOUS at 21:03

## 2025-02-13 RX ADMIN — SODIUM CHLORIDE, PRESERVATIVE FREE 10 ML: 5 INJECTION INTRAVENOUS at 09:15

## 2025-02-13 RX ADMIN — METOPROLOL TARTRATE 25 MG: 25 TABLET, FILM COATED ORAL at 09:15

## 2025-02-13 RX ADMIN — ATORVASTATIN CALCIUM 80 MG: 80 TABLET, FILM COATED ORAL at 21:01

## 2025-02-13 RX ADMIN — ENOXAPARIN SODIUM 30 MG: 100 INJECTION SUBCUTANEOUS at 21:01

## 2025-02-13 ASSESSMENT — PAIN DESCRIPTION - ORIENTATION
ORIENTATION: LEFT
ORIENTATION: LEFT

## 2025-02-13 ASSESSMENT — PAIN SCALES - GENERAL
PAINLEVEL_OUTOF10: 7
PAINLEVEL_OUTOF10: 2
PAINLEVEL_OUTOF10: 7

## 2025-02-13 ASSESSMENT — PAIN SCALES - WONG BAKER
WONGBAKER_NUMERICALRESPONSE: HURTS A LITTLE BIT
WONGBAKER_NUMERICALRESPONSE: HURTS A LITTLE BIT

## 2025-02-13 ASSESSMENT — PAIN DESCRIPTION - DESCRIPTORS
DESCRIPTORS: ACHING
DESCRIPTORS: ACHING;DISCOMFORT

## 2025-02-13 ASSESSMENT — PAIN DESCRIPTION - LOCATION
LOCATION: LEG;FOOT
LOCATION: LEG

## 2025-02-13 ASSESSMENT — PAIN - FUNCTIONAL ASSESSMENT
PAIN_FUNCTIONAL_ASSESSMENT: ACTIVITIES ARE NOT PREVENTED
PAIN_FUNCTIONAL_ASSESSMENT: PREVENTS OR INTERFERES SOME ACTIVE ACTIVITIES AND ADLS

## 2025-02-13 NOTE — PROGRESS NOTES
Speech Language Pathology  [facility] - Inpatient Rehabilitation Services  [phone]  SLP Dysphagia and Speech Language Cognitive Treatment       Patient: Irvin Arreola   : 1950   MRN: [id number]      Evaluation Date: 2025      Admitting Dx: Falls [R29.6]  General weakness [R53.1]  SIRS (systemic inflammatory response syndrome) ([facility]) [R65.10]  Frequent falls [R29.6]  Acute CVA (cerebrovascular accident) ([facility]) [I63.9]  Treatment Diagnosis: Cognitive-Linguistic Deficits , Speech Language Deficits , Oropharyngeal Dysphagia   Pain: Denies                                  Recommendations      Recommended Diet and Intervention 2025:  Diet Solids Recommendation:  Dysphagia III Soft and bite sized  Liquid Consistency Recommendation:  Thin liquids  Recommended form of Meds: Meds in puree       Compensatory strategies: Upright as possible with all PO intake , Small bites/sips , Eat/feed slowly, Remain upright 30-45 min , Aspiration Precautions  Avoid mixed consistencies    Discharge Recommendations:  Recommend ongoing SLP for speech and dysphagia therapy upon discharge from hospital     History/Course of Treatment     H&P: \"Irvin Arreola is 74 y.o. male with history of hypertension and bilateral leg swelling  He now presents to the ER with complaint of frequent falls for the past 3 days  He is unable to ambulate even short distances such as from his bed to the door  He has bilateral hip pain which is worse on the left side  On exam he is not able to bend the left hip or knee because of pain  He also thinks the left side is more weak than the right  He does not know the year or where he is and unable to give list of his medication  He denies syncope or lightheadedness\"    Imaging:  CT Chest:   IMPRESSION:  No evidence of pulmonary embolism or acute pulmonary abnormality.     Extensive coronary arterial calcifications.    MRI:  IMPRESSION:  1. Acute lacunar infarct involving the right paramedian

## 2025-02-13 NOTE — CARE COORDINATION
02/13/25 1223   IMM Letter   IMM Letter given to Patient/Family/Significant other/Guardian/POA/by: IMM given to pt, pt is in agreement with DC plan   IMM Letter date given: 02/13/25   IMM Letter time given: 1221     Electronically signed by Shakir Rainey on 2/13/2025 at 12:23 PM

## 2025-02-13 NOTE — PROGRESS NOTES
Report given to DESIREE Zavala at bedside. Answered any questions or concerns regarding transfer. Personal belongings gathered. Pt transferred to [id number] via wheelchair in stable condition.

## 2025-02-13 NOTE — PLAN OF CARE
Problem: Chronic Conditions and Co-morbidities  Goal: Patient's chronic conditions and co-morbidity symptoms are monitored and maintained or improved  Outcome: Progressing     Problem: Discharge Planning  Goal: Discharge to home or other facility with appropriate resources  Outcome: Progressing     Problem: Pain  Goal: Verbalizes/displays adequate comfort level or baseline comfort level  Outcome: Progressing     Problem: Safety - Adult  Goal: Free from fall injury  Outcome: Progressing     Problem: Respiratory - Adult  Goal: Achieves optimal ventilation and oxygenation  Outcome: Progressing     Problem: Cardiovascular - Adult  Goal: Maintains optimal cardiac output and hemodynamic stability  Outcome: Progressing     Problem: Musculoskeletal - Adult  Goal: Return ADL status to a safe level of function  Outcome: Progressing

## 2025-02-13 NOTE — PROGRESS NOTES
[facility] - Inpatient Rehabilitation Department   Phone: [phone]    Physical Therapy    [] Initial Evaluation            [x] Daily Treatment Note         [] Discharge Summary      Patient: Irvin Arreola   : 1950   MRN: [id number]   Date of Service:  2025  Admitting Diagnosis: Frequent falls  Current Admission Summary: Irvin Arreola is a 74 y.o. male who presented to [facility] ER with complaints of leg pain and multiple falls recently. Poor historian. He does not appear to be very active. Describes pain in the left lateral hip of moderate intensity and of aching nature since 2-3 days ago which is relieved by rest. Denies new numbness/tingling. 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 Patient lives in AL facility and uses no assistive devices by his report to ambulate.  MRI: Acute lacunar infarct involving the right paramedian lacie.   Past Medical History:  has a past medical history of JELENA (acute kidney injury) (HCC), Frequent falls, High cholesterol, Hypertension, Kidney stone, Leg swelling, and Suspected CHF (congestive heart failure).  Past Surgical History:  has a past surgical history that includes Appendectomy and Tonsillectomy.  Discharge Recommendations: Irvin Arreola scored a 17/24 on the AM-PAC short mobility form. Current research shows that an AM-PAC score of 17 or less is typically not associated with a discharge to the patient's home setting. Based on the patient's AM-PAC score and their current functional mobility deficits, it is recommended that the patient have 5-7 sessions per week of Physical Therapy at d/c to increase the patient's independence.  At this time, this patient demonstrates complex nursing, medical, and rehabilitative needs, and would benefit from intensive rehabilitation services upon discharge from the Inpatient setting.  This patient  support  Dynamic Standing Balance: fair (-): maintains balance at CGA with use of UE support  Comments:     Other Therapeutic Interventions  Standing Exercises: B LE heel raises, cone taps 3x10 each at ballet bars; standing balance including semi tandem with R LE leading and L LE leading 2x for 30 seconds each, NBOS 2x 30 second hold, requires CGA-min A for balance support     Functional Outcomes  AM-PAC Inpatient Mobility Raw Score : 17              Cognition  Overall Cognitive Status: Impaired  Arousal/Alertness: appropriate responses to stimuli  Following Commands: follows one step commands with repetition, follows one step commands with increased time  Attention Span: attends with cues to redirect  Memory: decreased recall of recent events  Safety Judgement: decreased awareness of need for assistance, decreased awareness of need for safety  Insights: decreased awareness of deficits  Initiation: requires cues for some  Sequencing: requires cues for some  Orientation:    oriented to person, oriented to place, oriented to situation, and disoriented to time   Command Following:   accurately follows one step commands    Education  Barriers To Learning: cognition and hearing  Patient Education: patient educated on goals, PT role and benefits, plan of care, general safety, functional mobility training, proper use of assistive device/equipment, transfer training, discharge recommendations  Learning Assessment:  patient will require reinforcement due to cognitive deficits    Assessment  Activity Tolerance: Good; mild SOB noted following activity SpO2 >90%  Impairments Requiring Therapeutic Intervention: decreased functional mobility, decreased strength, decreased safety awareness, decreased cognition, decreased endurance, decreased sensation, decreased balance, decreased fine motor control, decreased coordination  Prognosis: good  Clinical Assessment: Patient is a 74 year old male admitted for frequent falls. Prior to

## 2025-02-13 NOTE — CARE COORDINATION
Discharge Planning:    CM called [facility] [phone] and spoke with  and was informed they do have SNF [facility].  CM was asked to call Linda [phone] for questions regarding taking pt back at AL and then doing pre-cert for SNF.     [facility] [place] fax: [phone]    Update: CM spoke with Linda and she confirmed they don't have SNF and have only nursing and home aide thru [facility].    Update: CM was informed pt is denied for ARU.    Update: CM spoke with pt and pt would like to go back to AL with [facility]. Pt also informed to reach out to his sister for confirmation.    CM called pt's sister and she doesn't feel safe for the pt to go AL with [facility] and wants SNF and provided two options: [facility] and Home [facility]. Sister reports her  just had major surgery and won't be able to assist as much with pt.    CM called Yamilet with [facility] [phone] and provided pt's information, Yamilet reports she will look into it and let CM know if they can accept.    CM called [facility] and she informed they are not in network with pt's insurance.    Update: CM received a call from [facility] and they can accept pt, will start pre-cert today.    Electronically signed by Shakir Rainey on 2/13/2025 at 10:24 AM  Electronically signed by Shakir Rainey on 2/13/2025 at 11:31 AM

## 2025-02-13 NOTE — PLAN OF CARE
Problem: Chronic Conditions and Co-morbidities  Goal: Patient's chronic conditions and co-morbidity symptoms are monitored and maintained or improved  2/13/2025 1429 by Colt Cedeno RN  Outcome: Progressing     Problem: Discharge Planning  Goal: Discharge to home or other facility with appropriate resources  2/13/2025 1429 by Colt Cedeno RN  Outcome: Progressing     Problem: Pain  Goal: Verbalizes/displays adequate comfort level or baseline comfort level  2/13/2025 1429 by Colt Cedeno RN  Outcome: Progressing

## 2025-02-13 NOTE — PROGRESS NOTES
pm: TECHNIQUE: CT of the thoracic spine was performed without the administration of intravenous contrast. Multiplanar reformatted images are provided for review. Automated exposure control, iterative reconstruction, and/or weight based adjustment of the mA/kV was utilized to reduce the radiation dose to as low as reasonably achievable. COMPARISON: None. HISTORY: ORDERING SYSTEM PROVIDED HISTORY: fall TECHNOLOGIST PROVIDED HISTORY: Reason for exam:->fall Reason for Exam: fall Relevant Medical/Surgical History: Fall (Pt came in from [facility] via [facility], pt reports 2 falls today, pt denies hitting head, pt reports all over left sided pain. Pt denies blood thinner use.) FINDINGS: BONES/ALIGNMENT: There is normal alignment of the spine. The vertebral body heights are maintained. No osseous destructive lesion is seen. DEGENERATIVE CHANGES: No gross spinal canal stenosis or bony neural foraminal narrowing of the thoracic spine. SOFT TISSUES: No paraspinal mass is seen.  Small hiatal hernia is present.     No evidence of an acute fracture or traumatic malalignment involving the thoracic spine     CT CERVICAL SPINE WO CONTRAST    Result Date: 2/5/2025  EXAMINATION: CT OF THE CERVICAL SPINE WITHOUT CONTRAST 2/5/2025 9:15 pm TECHNIQUE: CT of the cervical spine was performed without the administration of intravenous contrast. Multiplanar reformatted images are provided for review. Automated exposure control, iterative reconstruction, and/or weight based adjustment of the mA/kV was utilized to reduce the radiation dose to as low as reasonably achievable. COMPARISON: None. HISTORY: ORDERING SYSTEM PROVIDED HISTORY: fall TECHNOLOGIST PROVIDED HISTORY: Reason for exam:->fall Decision Support Exception - unselect if not a suspected or confirmed emergency medical condition->Emergency Medical Condition (MA) Reason for Exam: fall Relevant Medical/Surgical History: Fall (Pt came in from [facility]  is maintained. No erosions or sclerosis.  Soft tissues unremarkable.  No radiopaque foreign bodies are seen.     No acute osseous abnormality.       Cultures:  Organism: No results found for: \"ORG\"      DISCLAIMER: Please note that some or all of this record was generated using voice recognition software. Efforts were made by me to ensure accuracy, however some errors may be present due to limitations of this technology and occasionally words are not transcribed correctly. If there are any questions about the content of this document, please contact the author.    Electronically signed by: Electronically signed by Bud Diaz MD on 2/13/2025 at 11:51 AM, 2/13/2025 11:51 AM

## 2025-02-14 VITALS
OXYGEN SATURATION: 92 % | DIASTOLIC BLOOD PRESSURE: 87 MMHG | WEIGHT: 193.34 LBS | HEIGHT: 66 IN | BODY MASS INDEX: 31.07 KG/M2 | RESPIRATION RATE: 18 BRPM | HEART RATE: 66 BPM | TEMPERATURE: 98.8 F | SYSTOLIC BLOOD PRESSURE: 134 MMHG

## 2025-02-14 PROCEDURE — 6370000000 HC RX 637 (ALT 250 FOR IP): Performed by: HOSPITALIST

## 2025-02-14 PROCEDURE — 6370000000 HC RX 637 (ALT 250 FOR IP): Performed by: STUDENT IN AN ORGANIZED HEALTH CARE EDUCATION/TRAINING PROGRAM

## 2025-02-14 PROCEDURE — 6360000002 HC RX W HCPCS: Performed by: INTERNAL MEDICINE

## 2025-02-14 PROCEDURE — 92507 TX SP LANG VOICE COMM INDIV: CPT

## 2025-02-14 PROCEDURE — 97110 THERAPEUTIC EXERCISES: CPT

## 2025-02-14 PROCEDURE — 97116 GAIT TRAINING THERAPY: CPT

## 2025-02-14 PROCEDURE — 2500000003 HC RX 250 WO HCPCS: Performed by: STUDENT IN AN ORGANIZED HEALTH CARE EDUCATION/TRAINING PROGRAM

## 2025-02-14 PROCEDURE — 92526 ORAL FUNCTION THERAPY: CPT

## 2025-02-14 PROCEDURE — 6370000000 HC RX 637 (ALT 250 FOR IP): Performed by: NURSE PRACTITIONER

## 2025-02-14 RX ADMIN — ACETAMINOPHEN 1000 MG: 500 TABLET ORAL at 10:17

## 2025-02-14 RX ADMIN — METOPROLOL TARTRATE 25 MG: 25 TABLET, FILM COATED ORAL at 10:17

## 2025-02-14 RX ADMIN — NIFEDIPINE 30 MG: 30 TABLET, EXTENDED RELEASE ORAL at 10:35

## 2025-02-14 RX ADMIN — DICLOFENAC SODIUM 2 G: 10 GEL TOPICAL at 10:21

## 2025-02-14 RX ADMIN — ASPIRIN 81 MG: 81 TABLET, CHEWABLE ORAL at 10:17

## 2025-02-14 RX ADMIN — SODIUM CHLORIDE, PRESERVATIVE FREE 10 ML: 5 INJECTION INTRAVENOUS at 10:19

## 2025-02-14 RX ADMIN — POLYETHYLENE GLYCOL 3350 17 G: 17 POWDER, FOR SOLUTION ORAL at 10:18

## 2025-02-14 RX ADMIN — CALCIUM POLYCARBOPHIL 625 MG: 625 TABLET ORAL at 10:17

## 2025-02-14 RX ADMIN — LOSARTAN POTASSIUM 50 MG: 25 TABLET, FILM COATED ORAL at 10:18

## 2025-02-14 RX ADMIN — ENOXAPARIN SODIUM 30 MG: 100 INJECTION SUBCUTANEOUS at 10:17

## 2025-02-14 ASSESSMENT — PAIN SCALES - GENERAL: PAINLEVEL_OUTOF10: 0

## 2025-02-14 NOTE — CARE COORDINATION
Discharge Planning Note:     (ABAD) spoke Yamilet @ [facility] [phone] re patient pre-cert. Pre-cert was approved.  Electronically signed by ROBERT Card on 2/14/25 at 9:05 AM EST

## 2025-02-14 NOTE — PROGRESS NOTES
Assessment completed and documented.  Denies pain.  AM meds given per order.  Repositioned self in bed.  Denies needs.

## 2025-02-14 NOTE — PROGRESS NOTES
Nutrition Note    RECOMMENDATIONS  PO Diet: Continue the current diet     ASSESSMENT   Pt triggered nutrition assessment for LOS.  S/p: acute rt pontine stroke with L side weakness.  Wt hx review inducates about 8lb wt loss during the 8 days hospital stay, which most likely caused by reduced po intake and fluid weight loss,  I/O: -3.3L noted.   Pt has been working with SLP, currently he is on a reg diet, dys soft and bite sized texture.  Pt reported he is tolerating diet well, appetite is good with % po meal intake.  Skin is intact. No new nutrition recs at this time. Will cont to follow up.       Malnutrition Status: Insufficient data  Acute Illness  Findings of the 6 clinical characteristics of malnutrition:  Energy Intake:  Mild decrease in energy intake  Weight Loss:  Unable to assess (Including fluid related wt changes)     Body Fat Loss:  No body fat loss     Muscle Mass Loss:  No muscle mass loss    Fluid Accumulation:  No fluid accumulation     Strength:  Not Performed      NUTRITION DIAGNOSIS   No nutrition diagnosis at this time related to   as evidenced by      Goals: PO intake 75% or greater, by next RD assessment     NUTRITION RELATED FINDINGS  Objective: A1C 4.9 on 2/7/25. LBM 2/12. No edema noted. Nonpitting  BLE edmated upon admission.  Wounds: None    CURRENT NUTRITION THERAPIES  ADULT DIET; Dysphagia - Soft and Bite Sized       PO Intake: 51-75%, %   PO Supplement Intake:None Ordered      ANTHROPOMETRICS  Current Height: 167.7 cm (5' 6.02\")  Current Weight - Scale: 87.7 kg (193 lb 5.5 oz)    Admission weight: 91.2 kg (201 lb)    COMPARATIVE STANDARDS  Total Energy Requirements (kcals/day): 6963-0751     Protein (g):         Fluid (mL/day):  2218-7205    EDUCATION  Education/Counseling not indicated     The patient will be monitored per nutrition standards of care. Consult dietitian if additional nutrition interventions are needed prior to RD reassessment.     Joey Nunez RD

## 2025-02-14 NOTE — PROGRESS NOTES
Attempted to call and give report to [facility]. Transferred twice with no answer.  Left a general message with a return phone number asking them to return my call.  Will attempt to call again.

## 2025-02-14 NOTE — PLAN OF CARE
Problem: Chronic Conditions and Co-morbidities  Goal: Patient's chronic conditions and co-morbidity symptoms are monitored and maintained or improved  2/14/2025 0145 by Zain Navarro RN  Outcome: Progressing  2/13/2025 1429 by Colt Cedeno RN  Outcome: Progressing     Problem: Discharge Planning  Goal: Discharge to home or other facility with appropriate resources  2/14/2025 0145 by Zain Navarro RN  Outcome: Progressing  2/13/2025 1429 by Colt Cedeno RN  Outcome: Progressing     Problem: Pain  Goal: Verbalizes/displays adequate comfort level or baseline comfort level  2/14/2025 0145 by Zain Navarro RN  Outcome: Progressing  2/13/2025 1429 by Colt Cedeno RN  Outcome: Progressing     Problem: Safety - Adult  Goal: Free from fall injury  Outcome: Progressing     Problem: Respiratory - Adult  Goal: Achieves optimal ventilation and oxygenation  Outcome: Progressing     Problem: Cardiovascular - Adult  Goal: Maintains optimal cardiac output and hemodynamic stability  Outcome: Progressing     Problem: Musculoskeletal - Adult  Goal: Return ADL status to a safe level of function  Outcome: Progressing

## 2025-02-14 NOTE — PLAN OF CARE
Problem: Physical Therapy - Adult  Goal: By Discharge: Performs mobility at highest level of function for planned discharge setting.  See evaluation for individualized goals.  2/14/2025 1403 by Gypsy Silva RN  Outcome: Adequate for Discharge     Problem: Chronic Conditions and Co-morbidities  Goal: Patient's chronic conditions and co-morbidity symptoms are monitored and maintained or improved  2/14/2025 1403 by Gypsy Silva, RN  Outcome: Adequate for Discharge  2/14/2025 0145 by Zain Navarro RN  Outcome: Progressing     Problem: Discharge Planning  Goal: Discharge to home or other facility with appropriate resources  2/14/2025 1403 by Gypsy Silva RN  Outcome: Adequate for Discharge  2/14/2025 0145 by Zain Navarro RN  Outcome: Progressing     Problem: Pain  Goal: Verbalizes/displays adequate comfort level or baseline comfort level  2/14/2025 1403 by Gypsy Silva RN  Outcome: Adequate for Discharge  2/14/2025 0145 by Zain Navarro RN  Outcome: Progressing     Problem: Safety - Adult  Goal: Free from fall injury  2/14/2025 1403 by Gypsy Silva RN  Outcome: Adequate for Discharge  2/14/2025 0145 by Zain Navarro RN  Outcome: Progressing     Problem: Respiratory - Adult  Goal: Achieves optimal ventilation and oxygenation  2/14/2025 1403 by Gypsy Silva RN  Outcome: Adequate for Discharge  2/14/2025 0145 by Zain Navarro RN  Outcome: Progressing     Problem: Cardiovascular - Adult  Goal: Maintains optimal cardiac output and hemodynamic stability  2/14/2025 1403 by Gypsy Silva RN  Outcome: Adequate for Discharge  2/14/2025 0145 by Zain Navarro RN  Outcome: Progressing     Problem: Musculoskeletal - Adult  Goal: Return ADL status to a safe level of function  2/14/2025 1403 by Gypsy Silva RN  Outcome: Adequate for Discharge  2/14/2025 0145 by Zain Navarro RN  Outcome: Progressing     Problem: SLP Adult - Impaired Swallowing  Goal: By Discharge: Advance to least restrictive diet without

## 2025-02-14 NOTE — PROGRESS NOTES
[facility] - Inpatient Rehabilitation Department   Phone: [phone]    Physical Therapy    [] Initial Evaluation            [x] Daily Treatment Note         [] Discharge Summary      Patient: Irvin Arreola   : 1950   MRN: [id number]   Date of Service:  2025  Admitting Diagnosis: Frequent falls  Current Admission Summary: Irvin Arreola is a 74 y.o. male who presented to [facility] ER with complaints of leg pain and multiple falls recently. Poor historian. He does not appear to be very active. Describes pain in the left lateral hip of moderate intensity and of aching nature since 2-3 days ago which is relieved by rest. Denies new numbness/tingling. 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 Patient lives in AL facility and uses no assistive devices by his report to ambulate.  MRI: Acute lacunar infarct involving the right paramedian lacie.   Past Medical History:  has a past medical history of JELENA (acute kidney injury) (HCC), Frequent falls, High cholesterol, Hypertension, Kidney stone, Leg swelling, and Suspected CHF (congestive heart failure).  Past Surgical History:  has a past surgical history that includes Appendectomy and Tonsillectomy.  Discharge Recommendations: Irvin Arreola scored a 17/24 on the AM-PAC short mobility form. Current research shows that an AM-PAC score of 17 or less is typically not associated with a discharge to the patient's home setting. Based on the patient's AM-PAC score and their current functional mobility deficits, it is recommended that the patient have 5-7 sessions per week of Physical Therapy at d/c to increase the patient's independence.  At this time, this patient demonstrates complex nursing, medical, and rehabilitative needs, and would benefit from intensive rehabilitation services upon discharge from the Inpatient setting.  This patient  demonstrates the ability to participate in and benefit from an intensive therapy program with a coordinated interdisciplinary team approach to foster frequent, structured, and documented communication among disciplines, who will work together to establish, prioritize, and achieve treatment goals. Please see assessment section for further patient specific details. If patient discharges prior to next session this note will serve as a discharge summary.  Please see below for the latest assessment towards goals.    DME Required For Discharge: DME to be determined at next level of care, DME to be determined pending patient progress  Precautions/Restrictions: high fall risk  Weight Bearing Restrictions: weight bearing as tolerated   [x] Right Lower Extremity  [x] Left Lower Extremity     Required Braces/Orthotics: no braces required   [] Right  [] Left  Positional Restrictions:no positional restrictions    Pre-Admission Information (Patient is a questionable historian, [facility] contacted and confirmed information below)   Lives With: alone    Type of Home:  [facility]   Home Layout: one level  Home Access: level entry  Bathroom Layout: walk in shower  Bathroom Equipment: grab bars in shower, grab bars around toilet, shower chair, hand held shower head  Toilet Height: elevated height  Home Equipment: sock aide  Transfer Assistance: Independent without use of device  Ambulation Assistance:Independent without use of device  ADL Assistance: independent with all ADL's  IADL Assistance: requires assistance with meal prep, requires assistance with laundry, requires assistance with vacuuming, requires assistance with cleaning, requires assistance with driving/transportation  Active :        [] Yes  [x] No (Reports that his sister drives)   Hand Dominance: [] Left  [x] Right  Current Employment: retired.  Occupation: Nursing assistant   Recent Falls: Admitted for frequent falls. Patient reports 2

## 2025-02-14 NOTE — PROGRESS NOTES
chloride flush, 5-40 mL, PRN  sodium chloride, , PRN  potassium chloride, 40 mEq, PRN   Or  potassium alternative oral replacement, 40 mEq, PRN   Or  potassium chloride, 10 mEq, PRN  magnesium sulfate, 2,000 mg, PRN  acetaminophen, 650 mg, Q6H PRN   Or  acetaminophen, 650 mg, Q6H PRN  morphine, 4 mg, Q4H PRN  ipratropium 0.5 mg-albuterol 2.5 mg, 1 Dose, Q4H PRN  traMADol, 50 mg, Q6H PRN  sodium chloride flush, 5-40 mL, PRN  sodium chloride, , PRN  ondansetron, 4 mg, Q8H PRN   Or  ondansetron, 4 mg, Q6H PRN        LABS:  CBC: No results for input(s): \"WBC\", \"HGB\", \"PLT\" in the last 72 hours.  BMP:  No results for input(s): \"NA\", \"K\", \"CL\", \"CO2\", \"BUN\", \"CREATININE\", \"GLUCOSE\" in the last 72 hours.  Hepatic: No results for input(s): \"AST\", \"ALT\", \"BILITOT\", \"ALKPHOS\" in the last 72 hours.    Invalid input(s): \"ALB\"  Lipids:   Lab Results   Component Value Date/Time    CHOL 112 02/07/2025 04:26 AM    HDL 43 02/07/2025 04:26 AM    TRIG 63 02/07/2025 04:26 AM     Hemoglobin A1C:   Lab Results   Component Value Date/Time    LABA1C 4.9 02/07/2025 04:26 AM     TSH: No results found for: \"TSH\"  Troponin: No results found for: \"TROPONINT\"  Lactic Acid: No results for input(s): \"LACTA\" in the last 72 hours.  BNP: No results for input(s): \"PROBNP\" in the last 72 hours.  UA:  Lab Results   Component Value Date/Time    NITRU Negative 02/06/2025 02:03 AM    COLORU Yellow 02/06/2025 02:03 AM    PHUR 7.0 02/06/2025 02:03 AM    PHUR 5.5 06/05/2023 02:45 PM    WBCUA 1 06/01/2023 03:45 PM    RBCUA 0 06/01/2023 03:45 PM    MUCUS 1+ 10/20/2018 05:24 PM    BACTERIA None Seen 06/01/2023 03:45 PM    CLARITYU Clear 02/06/2025 02:03 AM    LEUKOCYTESUR Negative 02/06/2025 02:03 AM    UROBILINOGEN 0.2 02/06/2025 02:03 AM    BILIRUBINUR Negative 02/06/2025 02:03 AM    BLOODU Negative 02/06/2025 02:03 AM    GLUCOSEU Negative 02/06/2025 02:03 AM    KETUA Negative 02/06/2025 02:03 AM       Imaging Studies:  Reports reviewed.  Fluoroscopy modified  performed without the administration of intravenous contrast. Multiplanar reformatted images are provided for review. Automated exposure control, iterative reconstruction, and/or weight based adjustment of the mA/kV was utilized to reduce the radiation dose to as low as reasonably achievable. COMPARISON: None. HISTORY: ORDERING SYSTEM PROVIDED HISTORY: fall TECHNOLOGIST PROVIDED HISTORY: Reason for exam:->fall Reason for Exam: fall Relevant Medical/Surgical History: Fall (Pt came in from [facility], pt reports 2 falls today, pt denies hitting head, pt reports all over left sided pain. Pt denies blood thinner use.) FINDINGS: BONES/ALIGNMENT: There is normal alignment of the spine. The vertebral body heights are maintained. No osseous destructive lesion is seen. DEGENERATIVE CHANGES: No gross spinal canal stenosis or bony neural foraminal narrowing of the thoracic spine. SOFT TISSUES: No paraspinal mass is seen.  Small hiatal hernia is present.     No evidence of an acute fracture or traumatic malalignment involving the thoracic spine     CT CERVICAL SPINE WO CONTRAST    Result Date: 2/5/2025  EXAMINATION: CT OF THE CERVICAL SPINE WITHOUT CONTRAST 2/5/2025 9:15 pm TECHNIQUE: CT of the cervical spine was performed without the administration of intravenous contrast. Multiplanar reformatted images are provided for review. Automated exposure control, iterative reconstruction, and/or weight based adjustment of the mA/kV was utilized to reduce the radiation dose to as low as reasonably achievable. COMPARISON: None. HISTORY: ORDERING SYSTEM PROVIDED HISTORY: fall TECHNOLOGIST PROVIDED HISTORY: Reason for exam:->fall Decision Support Exception - unselect if not a suspected or confirmed emergency medical condition->Emergency Medical Condition (MA) Reason for Exam: fall Relevant Medical/Surgical History: Fall (Pt came in from [facility], pt reports

## 2025-02-14 NOTE — CARE COORDINATION
Case Management -  Discharge Note      Patient Name: Ivrin Arreola                   YOB: 1950  Room: [address] [place] [address]            Readmission Risk (Low < 19, Mod (19-27), High > 27): Readmission Risk Score: 10.9    Current PCP: Aly Ardon MD      (IMM) Important Message from Medicare:    Has pt received appropriate compliance notices before being discharged if required: yes  Compliance doc:  [x] 2nd IMM; [] Code 44 [] Cruz  Date Given: 2/13/25 Given By: Case Management    PT AM-PAC: 17 /24  OT AM-PAC: 16 /24    Patient/patient representative has been educated on the benefits of SNF as well as the possible risks of declining recommended services. Patient/patient representative has acknowledged the information provided and decided on the following discharge plan. Patient/ patient representative has been provided freedom of choice regarding service provider, supported by basic dialogue that supports the patient's individualized plan of care/goals.    [facility]    [address]  [address] [place] [address]  Phone:[phone]  Fax:[phone]    RN Report: [phone]    HENS: Document ID : [id number]      [facility] agency notified of discharge:  [] Yes [] No  [x] NA    Family notified of discharge:  [x] Yes  [] No  [] NA    Facility notified of discharge:  [x] Yes  [] No  [] NA    Pt is being discharged with Outpt IV Antibiotics  [] Yes [] No  [x] NA  If yes, make sure WALDEMAR is faxed to [facility] agency, and meds are called in to pharmacy by RN from WALDEMAR orders only.      Financial    Payor: [facility] MEDICARE / Plan: PADMINI [facility] MEDICARE / Product Type: *No Product type* /     Pharmacy:  Potential assistance Purchasing Medications: No  Meds-to-Beds request: No      [facility] - [place], [place] - [address] - P [phone] - F [phone]  [address] [address]  Phone: [phone] Fax: [phone]    KHUSHI

## 2025-02-14 NOTE — PROGRESS NOTES
Speech Language Pathology  [facility] - Inpatient Rehabilitation Services  [phone]  SLP Dysphagia and Speech Language Cognitive Treatment       Patient: Irvin Arreola   : 1950   MRN: [id number]      Evaluation Date: 2025      Admitting Dx: Falls [R29.6]  General weakness [R53.1]  SIRS (systemic inflammatory response syndrome) (formerly [facility]) [R65.10]  Frequent falls [R29.6]  Acute CVA (cerebrovascular accident) (formerly [facility]) [I63.9]  Treatment Diagnosis: Cognitive-Linguistic Deficits , Speech Language Deficits , Oropharyngeal Dysphagia   Pain: Denies                                  Recommendations      Recommended Diet and Intervention 2025:  Diet Solids Recommendation:  Dysphagia III Soft and bite sized  Liquid Consistency Recommendation:  Thin liquids  Recommended form of Meds: Meds in puree       Compensatory strategies: Upright as possible with all PO intake , Small bites/sips , Eat/feed slowly, Remain upright 30-45 min , Aspiration Precautions  Avoid mixed consistencies    Discharge Recommendations:  Recommend ongoing SLP for speech and dysphagia therapy upon discharge from hospital     History/Course of Treatment     H&P: \"Irvin Arreola is 74 y.o. male with history of hypertension and bilateral leg swelling  He now presents to the ER with complaint of frequent falls for the past 3 days  He is unable to ambulate even short distances such as from his bed to the door  He has bilateral hip pain which is worse on the left side  On exam he is not able to bend the left hip or knee because of pain  He also thinks the left side is more weak than the right  He does not know the year or where he is and unable to give list of his medication  He denies syncope or lightheadedness\"    Imaging:  CT Chest:   IMPRESSION:  No evidence of pulmonary embolism or acute pulmonary abnormality.     Extensive coronary arterial calcifications.    MRI:  IMPRESSION:  1. Acute lacunar infarct involving the right paramedian  on room air. Improved facial symmetry noted this date.  Trials of thin liquids and regular solids  were provided to assess swallow function. Pt unable to recall results or recommendations from Modified Barium Swallow. Education provided, pt with no carryover throughout session. Able to follow cues to take small bites/sips and eat slowly. Pt demonstrated minimally prolonged mastication with delayed oral clearing. Pharyngeal pooling was suspected with delayed swallow onset and intermittent double swallows. No immediate overt clinical s/s of aspiration/penetration. Provided appropriate safe swallow strategies, pt with no carryover. Engaged in bolus control exercises x10.  Based on today's assessment recommend Dysphagia III Soft and bite sized  with Thin liquids , Meds in puree , and ongoing dysphagia intervention for diet tolerance.       Eating Assistance:   Setup or clean-up assistance    Speech Language/Cognitive Treatment:  Impressions: This date goals were targeted via functional speech, orientation and short term recall tasks. Pt's speech was intelligible at the simple conversation level. Slightly decreased articulatory precision was assessed. Oriented to person, month and place. Unable recall new CVA despite repetition. Pt unable to recall working with PT prior to speech therapy. Unable to recall d/c recommendations, question if this is pt's cognitive baseline.   Based on today's session recommend continued skilled speech intervention targeting cognitive-linguistic skills to promote return to PLOF.      Assessment: Pt progressing toward goals      Goals     Goals:   Dysphagia Goals: Pt will functionally tolerate recommended diet with no overt clinical s/s of aspiration   Pt will demonstrate understanding of aspiration risk and precautions via education/demonstration with occasional prompting  If clinical s/s of aspiration/penetration continue to be noted, Pt will participate in Modified Barium Swallow Study

## 2025-02-14 NOTE — DISCHARGE INSTR - COC
Continuity of Care Form    Patient Name: Irvin Arreola   :  1950  MRN:  [id number]    Admit date:  2025  Discharge date:  2025    Code Status Order: DNR-CCA   Advance Directives:   Advance Care Flowsheet Documentation             Admitting Physician:  Anival Collins MD  PCP: Aly Ardon MD    Discharging Nurse: Yamilet Reynolds RN  Discharging Hospital Unit/Room#: 3AN-3313/3313-01  Discharging Unit Phone Number: [phone]    Emergency Contact:   Extended Emergency Contact Information  Primary Emergency Contact: Linda Garcia   [facility]  Home Phone: [phone]  Mobile Phone: [phone]  Relation: Brother/Sister    Past Surgical History:  Past Surgical History:   Procedure Laterality Date    APPENDECTOMY      TONSILLECTOMY         Immunization History:   Immunization History   Administered Date(s) Administered    COVID-19, MODERNA BLUE border, Primary or Immunocompromised, (age 12y+), IM, 100 mcg/0.5mL 2021, 2021    COVID-19, PFIZER Bivalent, DO NOT Dilute, (age 12y+), IM, 30 mcg/0.3 mL 2023       Active Problems:  Patient Active Problem List   Diagnosis Code    Chest pain R07.9    Leg swelling M79.89    Asymptomatic hypertensive urgency I16.0    Suspected CHF (congestive heart failure) R09.89    Localized edema R60.0    JELENA (acute kidney injury) ([facility]) N17.9    Tachycardia R00.0    HTN (hypertension), benign I10    Frequent falls R29.6    Cerebrovascular accident (CVA) ([facility]) I63.9       Isolation/Infection:   Isolation            No Isolation          Patient Infection Status       None to display                     Nurse Assessment:  Last Vital Signs: /79   Pulse 68   Temp 98 °F (36.7 °C) (Oral)   Resp 18   Ht 1.676 m (5' 6\")   Wt 87.7 kg (193 lb 5.5 oz)   SpO2 95%   BMI 31.21 kg/m²     Last documented pain score (0-10 scale): Pain Level: 7  Last Weight:   Wt Readings from Last 1 Encounters:   25 87.7 kg (193 lb 5.5 oz)     [place] [address]  Phone:[phone]  Fax:[phone]     Dialysis Facility (if applicable)       / signature: Electronically signed by ROBERT Card on 2/14/25 at 9:10 AM EST    PHYSICIAN SECTION    Prognosis: Fair    Condition at Discharge: Stable    Rehab Potential (if transferring to Rehab): Fair    Recommended Labs or Other Treatments After Discharge:     Physician Certification: I certify the above information and transfer of Irvin Arreola  is necessary for the continuing treatment of the diagnosis listed and that he requires Skilled Nursing Facility for less 30 days.     Update Admission H&P: No change in H&P    PHYSICIAN SIGNATURE:  Electronically signed by Bud Diaz MD on 2/14/25 at 11:20 AM EST

## 2025-02-18 ENCOUNTER — TELEPHONE (OUTPATIENT)
Dept: NEUROLOGY | Age: 75
End: 2025-02-18

## 2025-02-18 NOTE — TELEPHONE ENCOUNTER
Pt was seen ip on 02/11/25 with Dr. Nowak. Suellen with [facility], [phone], ext [phone].  would like a cb to schedule hospital fu for this pt. Suellen stated it was 1 wk Dr. Nowak's notes say 1 mo.    Please call back. Okay to schedule appt and leave time and date on vm.

## 2025-03-26 ENCOUNTER — OFFICE VISIT (OUTPATIENT)
Dept: NEUROLOGY | Age: 75
End: 2025-03-26
Payer: MEDICARE

## 2025-03-26 VITALS
HEART RATE: 73 BPM | HEIGHT: 66 IN | SYSTOLIC BLOOD PRESSURE: 137 MMHG | WEIGHT: 193 LBS | DIASTOLIC BLOOD PRESSURE: 82 MMHG | BODY MASS INDEX: 31.02 KG/M2

## 2025-03-26 DIAGNOSIS — I10 HTN (HYPERTENSION), BENIGN: ICD-10-CM

## 2025-03-26 DIAGNOSIS — F02.80 DEMENTIA DUE TO ALZHEIMER'S DISEASE (HCC): ICD-10-CM

## 2025-03-26 DIAGNOSIS — Z86.73 RECENT CEREBROVASCULAR ACCIDENT (CVA): Primary | ICD-10-CM

## 2025-03-26 DIAGNOSIS — G30.9 DEMENTIA DUE TO ALZHEIMER'S DISEASE (HCC): ICD-10-CM

## 2025-03-26 DIAGNOSIS — G93.49 LEUKOENCEPHALOPATHY: ICD-10-CM

## 2025-03-26 PROCEDURE — 99213 OFFICE O/P EST LOW 20 MIN: CPT | Performed by: PSYCHIATRY & NEUROLOGY

## 2025-03-26 PROCEDURE — 1159F MED LIST DOCD IN RCRD: CPT | Performed by: PSYCHIATRY & NEUROLOGY

## 2025-03-26 PROCEDURE — 3079F DIAST BP 80-89 MM HG: CPT | Performed by: PSYCHIATRY & NEUROLOGY

## 2025-03-26 PROCEDURE — 3075F SYST BP GE 130 - 139MM HG: CPT | Performed by: PSYCHIATRY & NEUROLOGY

## 2025-03-26 PROCEDURE — 1123F ACP DISCUSS/DSCN MKR DOCD: CPT | Performed by: PSYCHIATRY & NEUROLOGY

## 2025-03-26 PROCEDURE — 1160F RVW MEDS BY RX/DR IN RCRD: CPT | Performed by: PSYCHIATRY & NEUROLOGY

## 2025-03-26 NOTE — PATIENT INSTRUCTIONS
YOU MUST CONFIRM YOUR APPOINTMENT 1 DAY PRIOR OR IT WILL BE CANCELLED!!   Our office will call you 3 times the day prior to your appointment in an attempt to confirm.  Please return our call ASAP or confirm your appt through [employer] no later than 3 pm the day before your appointment.  If we do not hear back from you by 3 pm to confirm, your appointment will be cancelled & someone will be added into that slot from our wait list.

## 2025-03-26 NOTE — PROGRESS NOTES
The patient came today for follow up regarding: hospital follow up and recent stroke.     The patient was seen last month at [facility].  He was admitted for pelvic fracture and further imaging with MRI showed acute right pontine stroke due to worsening confusion.  He was discharged home on aspirin and Lopressor.  He came today with his wife.  He lives at [facility].  Does have chronic cognitive impairment for the last 4 to 5 years.  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  Wife denies any hallucination or psychosis.  No insomnia or parasomnia.  Blood pressure under 140 systolic.  He is on aspirin and Crestor.  Other review of system was unremarkable          Exam:   Constitutional:   Vitals:    03/26/25 1329   BP: 137/82   Pulse: 73   Weight: 87.5 kg (193 lb)   Height: 1.677 m (5' 6.02\")       General appearance:  Normal development and appear in no acute distress.   Mental Status:   He is awake alert x 2  Poor fund of knowledge and immediate recall  Intact remote memory  Language is fluent  Cranial Nerves: Pupil round regular and reactive, extraocular muscles were intact, no ophthalmoplegia, face is symmetric, tongue is midline and rest of cranial nerves are normal    Musculoskeletal: no focal weakness in UE or LL.   Reflexes: Symmetric 2+ in both arms and legs  Coordination:no abnormal movements or dysmetria  Sensation: normal in all extremities.  Gait/Posture: unsteady gait.     ROS : A 10-14 system review of constitutional, cardiovascular, respiratory, GI, eyes, , ENT, musculoskeletal, endocrine, hematological, skin, SHEENT, genitourinary, psychiatric and neurologic systems was obtained and updated today which is unremarkable except as mentioned in my HPI      Medical decision making:    A. Problems (any 1)    High:    [] Acute/Chronic Illness/injury posing threat to life or bodily function:    [] Severe exacerbation of chronic illness:      Moderate:    [x]     1 or more chronic illness with